# Patient Record
Sex: MALE | Race: WHITE | Employment: OTHER | URBAN - METROPOLITAN AREA
[De-identification: names, ages, dates, MRNs, and addresses within clinical notes are randomized per-mention and may not be internally consistent; named-entity substitution may affect disease eponyms.]

---

## 2024-07-12 ENCOUNTER — HOSPITAL ENCOUNTER (OUTPATIENT)
Dept: RADIOLOGY | Facility: HOSPITAL | Age: 64
Discharge: HOME | End: 2024-07-12
Payer: MEDICARE

## 2024-07-12 DIAGNOSIS — D48.7 NEOPLASM OF UNCERTAIN BEHAVIOR OF OTHER SPECIFIED SITES: ICD-10-CM

## 2024-07-12 LAB
CREAT SERPL-MCNC: 1.15 MG/DL (ref 0.6–1.3)
GFR SERPL CREATININE-BSD FRML MDRD: 70 ML/MIN/1.73M*2

## 2024-07-12 PROCEDURE — 82565 ASSAY OF CREATININE: CPT

## 2024-07-12 PROCEDURE — 70487 CT MAXILLOFACIAL W/DYE: CPT

## 2024-07-12 PROCEDURE — 2550000001 HC RX 255 CONTRASTS: Performed by: OTOLARYNGOLOGY

## 2024-07-18 ENCOUNTER — HOSPITAL ENCOUNTER (OUTPATIENT)
Dept: RADIOLOGY | Facility: CLINIC | Age: 64
Discharge: HOME | End: 2024-07-18
Payer: MEDICARE

## 2024-07-18 DIAGNOSIS — D48.7 NEOPLASM OF UNCERTAIN BEHAVIOR OF OTHER SPECIFIED SITES: ICD-10-CM

## 2024-07-18 PROCEDURE — 70543 MRI ORBT/FAC/NCK W/O &W/DYE: CPT

## 2024-07-18 PROCEDURE — A9575 INJ GADOTERATE MEGLUMI 0.1ML: HCPCS | Performed by: OTOLARYNGOLOGY

## 2024-07-18 PROCEDURE — 2550000001 HC RX 255 CONTRASTS: Performed by: OTOLARYNGOLOGY

## 2024-07-18 RX ORDER — GADOTERATE MEGLUMINE 376.9 MG/ML
0.2 INJECTION INTRAVENOUS
Status: COMPLETED | OUTPATIENT
Start: 2024-07-18 | End: 2024-07-18

## 2024-07-22 ENCOUNTER — OFFICE VISIT (OUTPATIENT)
Dept: OTOLARYNGOLOGY | Facility: HOSPITAL | Age: 64
End: 2024-07-22
Payer: MEDICARE

## 2024-07-22 VITALS — HEIGHT: 73 IN | BODY MASS INDEX: 22.53 KG/M2 | TEMPERATURE: 98.2 F | WEIGHT: 170 LBS

## 2024-07-22 DIAGNOSIS — R22.0 FACIAL MASS: ICD-10-CM

## 2024-07-22 PROBLEM — F02.A0: Status: ACTIVE | Noted: 2024-07-22

## 2024-07-22 PROBLEM — G30.9: Status: ACTIVE | Noted: 2024-07-22

## 2024-07-22 PROCEDURE — 31575 DIAGNOSTIC LARYNGOSCOPY: CPT | Performed by: OTOLARYNGOLOGY

## 2024-07-22 PROCEDURE — 1159F MED LIST DOCD IN RCRD: CPT | Performed by: OTOLARYNGOLOGY

## 2024-07-22 PROCEDURE — 1157F ADVNC CARE PLAN IN RCRD: CPT | Performed by: OTOLARYNGOLOGY

## 2024-07-22 PROCEDURE — 1160F RVW MEDS BY RX/DR IN RCRD: CPT | Performed by: OTOLARYNGOLOGY

## 2024-07-22 PROCEDURE — 3008F BODY MASS INDEX DOCD: CPT | Performed by: OTOLARYNGOLOGY

## 2024-07-22 PROCEDURE — 99215 OFFICE O/P EST HI 40 MIN: CPT | Mod: 25 | Performed by: OTOLARYNGOLOGY

## 2024-07-22 PROCEDURE — 10021 FNA BX W/O IMG GDN 1ST LES: CPT | Performed by: OTOLARYNGOLOGY

## 2024-07-22 PROCEDURE — 1036F TOBACCO NON-USER: CPT | Performed by: OTOLARYNGOLOGY

## 2024-07-22 PROCEDURE — 99215 OFFICE O/P EST HI 40 MIN: CPT | Performed by: OTOLARYNGOLOGY

## 2024-07-22 PROCEDURE — 88112 CYTOPATH CELL ENHANCE TECH: CPT | Mod: TC,MCY | Performed by: OTOLARYNGOLOGY

## 2024-07-22 ASSESSMENT — PATIENT HEALTH QUESTIONNAIRE - PHQ9
SUM OF ALL RESPONSES TO PHQ9 QUESTIONS 1 & 2: 0
1. LITTLE INTEREST OR PLEASURE IN DOING THINGS: NOT AT ALL
2. FEELING DOWN, DEPRESSED OR HOPELESS: NOT AT ALL

## 2024-07-22 NOTE — LETTER
July 22, 2024     Mp Bee MD  [address]  [facility], [facility] [address]  [facility] [address]    Patient: Edmund Matthews   YOB: 1960   Date of Visit: 7/22/2024       Dear Dr. Mp Bee MD:    Thank you for referring Edmund Matthews to me for evaluation. Below are my notes for this consultation.  If you have questions, please do not hesitate to call me. I look forward to following your patient along with you.       Sincerely,     Misael Mitchell MD      CC: Sylvester Walker MD  ______________________________________________________________________________________    MrThaddeus Matthews is a very pleasant 64-year-old gentleman with a sudden onset of a new facial mass appearing approximately 2 weeks ago.  The patient was in his usual state of good health when he noted a swelling just lateral to his left nasal vault.  This has not changed significantly in size.  The mass is not painful and was not preceded by trauma or other inciting events.  The patient is a non-smoker and nondrinker.  He was recently diagnosed with early onset Alzheimer's.    On physical examination he is a healthy-appearing gentleman in no distress.  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  There is a fullness just superior and lateral to the nasal ala which is ballotable.  Inspection of his oral cavity reveals an expansion of the alveolus and a significant fullness of the hard palate.  The patient states that his hard palate has not changed over time and his wife is unaware of whether or not these changes are new or pre-existing.  With the patient's history of Alzheimer's is difficult to determine if this was a longstanding change due to his cleft lip and palate or new due to this mass.  He does not have any loose teeth in his left maxillary region.  His neck is supple without adenopathy or masses.    Flexible fiberoptic examination was performed through  the left nares.  Inspection of his nasal cavity reveals a narrowed vault however no evidence of an intranasal mass.  There does appear to be inward bulging from the left lateral nasal wall.  His nasopharynx oropharynx and larynx are all within normal limits.    Procedure: Fine-needle aspiration  After injection of 1% lidocaine with 1 100,000 epinephrine, a 25-gauge needle was passed in to the facial mass transfacial he.  2 separate passes were performed and placed in CytoLyt.  These were sent to cytology for pathologic examination.    Overall, it appears that Mr. Edmund Matthews has a neoplastic mass of his left maxilla and anterior facial structures.  I instructed the wife and the patient that this process is most likely neoplastic and may represent a facial lymphoma versus a malignancy.  I performed a fine-needle aspiration today to rule out a malignancy but informed the family that if this returns consistent with lymphocytes, or is nondiagnostic, we will need to do an open biopsy procedure for definitive diagnosis.  Patient and the family understand that this is an outpatient brief procedure and would be arranged and lieu of his next appointment.  Another possibility would be a Nba-Cut needle biopsy at the hands of our interventional radiologist.  We will await the results of his fine-needle aspiration and proceed with the appropriate next steps.

## 2024-07-22 NOTE — PROGRESS NOTES
Mr. Edmund Matthews is a very pleasant 64-year-old gentleman with a sudden onset of a new facial mass appearing approximately 2 weeks ago.  The patient was in his usual state of good health when he noted a swelling just lateral to his left nasal vault.  This has not changed significantly in size.  The mass is not painful and was not preceded by trauma or other inciting events.  The patient is a non-smoker and nondrinker.  He was recently diagnosed with early onset Alzheimer's.    On physical examination he is a healthy-appearing gentleman in no distress.  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  There is a fullness just superior and lateral to the nasal ala which is ballotable.  Inspection of his oral cavity reveals an expansion of the alveolus and a significant fullness of the hard palate.  The patient states that his hard palate has not changed over time and his wife is unaware of whether or not these changes are new or pre-existing.  With the patient's history of Alzheimer's is difficult to determine if this was a longstanding change due to his cleft lip and palate or new due to this mass.  He does not have any loose teeth in his left maxillary region.  His neck is supple without adenopathy or masses.    Flexible fiberoptic examination was performed through the left nares.  Inspection of his nasal cavity reveals a narrowed vault however no evidence of an intranasal mass.  There does appear to be inward bulging from the left lateral nasal wall.  His nasopharynx oropharynx and larynx are all within normal limits.    Procedure: Fine-needle aspiration  After injection of 1% lidocaine with 1 100,000 epinephrine, a 25-gauge needle was passed in to the facial mass transfacial he.  2 separate passes were performed and placed in CytoLyt.  These were sent to cytology for pathologic examination.    Overall, it appears that Mr. Edmund Matthews has a neoplastic mass of his  left maxilla and anterior facial structures.  I instructed the wife and the patient that this process is most likely neoplastic and may represent a facial lymphoma versus a malignancy.  I performed a fine-needle aspiration today to rule out a malignancy but informed the family that if this returns consistent with lymphocytes, or is nondiagnostic, we will need to do an open biopsy procedure for definitive diagnosis.  Patient and the family understand that this is an outpatient brief procedure and would be arranged and lieu of his next appointment.  Another possibility would be a Nba-Cut needle biopsy at the hands of our interventional radiologist.  We will await the results of his fine-needle aspiration and proceed with the appropriate next steps.

## 2024-07-23 LAB
LABORATORY COMMENT REPORT: NORMAL
LABORATORY COMMENT REPORT: NORMAL
PATH REPORT.COMMENTS IMP SPEC: NORMAL
PATH REPORT.FINAL DX SPEC: NORMAL
PATH REPORT.GROSS SPEC: NORMAL
PATH REPORT.RELEVANT HX SPEC: NORMAL
PATH REPORT.TOTAL CANCER: NORMAL

## 2024-07-25 ENCOUNTER — TELEPHONE (OUTPATIENT)
Dept: OTOLARYNGOLOGY | Facility: HOSPITAL | Age: 64
End: 2024-07-25
Payer: MEDICARE

## 2024-07-25 DIAGNOSIS — R22.0 FACIAL MASS: ICD-10-CM

## 2024-07-25 NOTE — TELEPHONE ENCOUNTER
"I called and left a message yesterday at 5:30 p.m. asking Gab to give me a call.  We have his biopsy results back.    Lena, his wife left me a message last night at 8:27 p.m. stating she will have Edmund call me today, however, she's going out of town today (7/25/24).  She added that Edmund \"has early Alzehiemer's\" so if I have issues connecting with him to call her cell ([phone]).    Edmund did call me and left messages at 9:48 a.m., 9:56 a.m., 10:09 a.m. and 10:22 a.m.  I called him back and reviewed the biopsy results with him.    I stated as Dr. Mitchell told him in the office if the biopsy came back with lymphocytes, he would either need an open biopsy or an IR biopsy.  I explained the difference to him and told him that he would need a  for which ever one he chose.  I added that I was aware Lena was out of town.  He stated she was coming back on 7/31/24.    He opted for the IR biopsy.  I gave him the phone number to call to schedule the test ([phone]).  I asked that he speak with Lena first so that he would have some dates that she would be available to drive him.  He stated he will call to arrange the procedure.    Just as a backup, I did call Lena to make her aware.  She was appreciative and stated she's will talk with Edmund.  "

## 2024-08-09 ENCOUNTER — HOSPITAL ENCOUNTER (OUTPATIENT)
Dept: RADIOLOGY | Facility: HOSPITAL | Age: 64
Discharge: HOME | End: 2024-08-09
Payer: MEDICARE

## 2024-08-09 VITALS
HEIGHT: 73 IN | SYSTOLIC BLOOD PRESSURE: 110 MMHG | DIASTOLIC BLOOD PRESSURE: 71 MMHG | RESPIRATION RATE: 14 BRPM | OXYGEN SATURATION: 96 % | TEMPERATURE: 97.5 F | HEART RATE: 51 BPM | BODY MASS INDEX: 23.86 KG/M2 | WEIGHT: 180 LBS

## 2024-08-09 DIAGNOSIS — R22.0 FACIAL MASS: ICD-10-CM

## 2024-08-09 PROCEDURE — 7100000010 HC PHASE TWO TIME - EACH INCREMENTAL 1 MINUTE

## 2024-08-09 PROCEDURE — 88307 TISSUE EXAM BY PATHOLOGIST: CPT | Mod: TC,SUR | Performed by: STUDENT IN AN ORGANIZED HEALTH CARE EDUCATION/TRAINING PROGRAM

## 2024-08-09 PROCEDURE — 88341 IMHCHEM/IMCYTCHM EA ADD ANTB: CPT | Performed by: PATHOLOGY

## 2024-08-09 PROCEDURE — 88184 FLOWCYTOMETRY/ TC 1 MARKER: CPT | Mod: TC | Performed by: STUDENT IN AN ORGANIZED HEALTH CARE EDUCATION/TRAINING PROGRAM

## 2024-08-09 PROCEDURE — 88307 TISSUE EXAM BY PATHOLOGIST: CPT | Performed by: PATHOLOGY

## 2024-08-09 PROCEDURE — 7100000009 HC PHASE TWO TIME - INITIAL BASE CHARGE

## 2024-08-09 PROCEDURE — 88342 IMHCHEM/IMCYTCHM 1ST ANTB: CPT | Performed by: PATHOLOGY

## 2024-08-09 PROCEDURE — 88271 CYTOGENETICS DNA PROBE: CPT | Performed by: STUDENT IN AN ORGANIZED HEALTH CARE EDUCATION/TRAINING PROGRAM

## 2024-08-09 PROCEDURE — 88275 CYTOGENETICS 100-300: CPT | Performed by: STUDENT IN AN ORGANIZED HEALTH CARE EDUCATION/TRAINING PROGRAM

## 2024-08-09 PROCEDURE — 88189 FLOWCYTOMETRY/READ 16 & >: CPT | Performed by: PATHOLOGY

## 2024-08-09 PROCEDURE — 2500000004 HC RX 250 GENERAL PHARMACY W/ HCPCS (ALT 636 FOR OP/ED): Performed by: RADIOLOGY

## 2024-08-09 PROCEDURE — 2720000007 HC OR 272 NO HCPCS

## 2024-08-09 PROCEDURE — 88341 IMHCHEM/IMCYTCHM EA ADD ANTB: CPT | Mod: TC,SUR,MUE | Performed by: STUDENT IN AN ORGANIZED HEALTH CARE EDUCATION/TRAINING PROGRAM

## 2024-08-09 PROCEDURE — 20206 BIOPSY MUSCLE PERQ NEEDLE: CPT

## 2024-08-09 PROCEDURE — 88365 INSITU HYBRIDIZATION (FISH): CPT | Performed by: PATHOLOGY

## 2024-08-09 RX ORDER — MIDAZOLAM HYDROCHLORIDE 1 MG/ML
INJECTION INTRAMUSCULAR; INTRAVENOUS
Status: COMPLETED | OUTPATIENT
Start: 2024-08-09 | End: 2024-08-09

## 2024-08-09 RX ORDER — FENTANYL CITRATE 50 UG/ML
INJECTION, SOLUTION INTRAMUSCULAR; INTRAVENOUS
Status: COMPLETED | OUTPATIENT
Start: 2024-08-09 | End: 2024-08-09

## 2024-08-09 ASSESSMENT — PAIN SCALES - GENERAL
PAINLEVEL_OUTOF10: 0 - NO PAIN

## 2024-08-09 ASSESSMENT — PAIN - FUNCTIONAL ASSESSMENT
PAIN_FUNCTIONAL_ASSESSMENT: 0-10

## 2024-08-09 NOTE — DISCHARGE INSTRUCTIONS
Discharge information    See Ultrasound guided biopsy    Ok to remove dressing on 8/10/24 at 2pm.  Ok to shower on 8/10/24, no baths, jacuzzis, or swimming. Do not get submerged in a body of water (leads to increased risk of infection.)  Ok to keep open until healed. Healing is when a scab is created and falls off, usually within 5-10 days.     Look for signs and symptoms of infection:  Including: redness, swelling, discharge such as pus, or odor from site.    Look for bleeding from site:  If bleeding occurs hold pressure for 5 minutes with paper towel, check site if still bleeding hold for 5 more minutes  If site continues to bleed after 10 minutes call 911.    You received moderate sedation:  - Do not drive a car, or operate any machinery or power tools of any kind.  - Do not drink any alcoholic drinks.  - Do not take any over the counter medications that may cause drowsiness.  - Do not make any important decisions or sign any legal documents.  - You need to have a responsible adult accompany you home.  - You may resume your normal diet.  - We strongly suggest that a responsible adult be with you for the rest of the day and also during the night. This is for your protection and safety.     For questions related to your procedure:  Please call [phone] between the hours of 7:00am-5:00pm Monday through Friday.  Please call [phone] for Resident on call weeknights after 5:00pm, on weekends, and holidays.     In the event of an emergency call 911 or go to your nearest emergency room.     Ok to use ice pack for 20 min to site as needed at a time.  Ok to take tylenol (500mg) 2 tablets (1000mg) by mouth every 6 hours as needed for pain. Max 4000mg/24 hours.

## 2024-08-09 NOTE — PRE-PROCEDURE NOTE
INTERVENTIONAL RADIOLOGY PRE-PROCEDURE NOTE    Edmund Matthews is a 64 y.o. male with no significant PMHx who presents to the interventional radiology department for left facial lesion biopsy.    Procedure: left facial lesion biopsy    Indication for procedure: The encounter diagnosis was Facial mass.    No past medical history on file.   Past Surgical History:   Procedure Laterality Date    OTHER SURGICAL HISTORY  01/03/2022    Cleft palate repair       Relevant Labs:   Lab Results   Component Value Date    CREATININE 1.13 05/27/2022    EGFR 70 07/12/2024       Planned Sedation/Anesthesia: Moderate    Directed physical examination:    General: Normal appearance, behavior, cognition and NAD  Lungs: No increased work of breathing    No current outpatient medications on file.     Mallampati: II (hard and soft palate, upper portion of tonsils anduvula visible)    ASA Score: ASA 2 - Patient with mild systemic disease with no functional limitations    Benefits, risks and alternatives of procedure and planned sedation have been discussed with the patient and/or their representative. All questions answered and they agree to proceed.     Pablo Riley MD  Diagnostic Radiology, PGY-5, R4      NON-Urgent on call weekends and after hours weekdays (5pm - 5am) IR pager: [phone]  Urgent & emergent on call weekends and after hours weekdays (5pm-7am) IR pager: [phone]

## 2024-08-09 NOTE — POST-PROCEDURE NOTE
Interventional Radiology Brief Postprocedure Note    Attending: Esther Castellano MD    Assistant: Pablo Riley MD    Diagnosis: left facial mass    Description of procedure:    A total of 2 passes were made into the left facial mass under ultrasound guidance using a 18 Gauge needle passed through a 17 gauge coaxial system. Scanning after each pass demonstrated no bleeding.  One core sample was placed in formalin and the other core sample was placed in RPMI. Specimens were sent to pathology for further analysis. See PACS for full procedural report.      Anesthesia:  Local, moderate sedation    Complications: None    Estimated Blood Loss: none    Medications (Filter: Administrations occurring from 1424 to 1500 on 08/09/24) As of 08/09/24 1500      fentaNYL PF (Sublimaze) injection (mcg) Total dose:  50 mcg      Date/Time Rate/Dose/Volume Action       08/09/24  1430 50 mcg Given               midazolam (Versed) injection (mg) Total dose:  1 mg      Date/Time Rate/Dose/Volume Action       08/09/24  1430 1 mg Given                     See detailed result report with images in PACS.    The patient tolerated the procedure well without incident or complication and is in stable condition.     Pablo Riley MD  Diagnostic Radiology, PGY-5, R4    NON-Urgent on call weekends and after hours weekdays (5pm - 5am) IR pager: [phone]  Urgent & emergent on call weekends and after hours weekdays (5pm-7am) IR pager: [phone]

## 2024-08-12 ENCOUNTER — APPOINTMENT (OUTPATIENT)
Dept: OTOLARYNGOLOGY | Facility: HOSPITAL | Age: 64
End: 2024-08-12
Payer: MEDICARE

## 2024-08-14 LAB
CELL COUNT (BLOOD): 0.01 X10*3/UL
CELL POPULATIONS: NORMAL
DIAGNOSIS: NORMAL
FLOW DIFFERENTIAL: NORMAL
FLOW TEST ORDERED: NORMAL
LAB TEST METHOD: NORMAL
NUMBER OF CELLS COLLECTED: NORMAL PER TUBE
PATH REPORT.TOTAL CANCER: NORMAL
SIGNATURE COMMENT: NORMAL
SPECIMEN VIABILITY: NORMAL

## 2024-08-22 LAB
LAB AP ASR DISCLAIMER: NORMAL
LABORATORY COMMENT REPORT: NORMAL
PATH REPORT.FINAL DX SPEC: NORMAL
PATH REPORT.GROSS SPEC: NORMAL
PATH REPORT.RELEVANT HX SPEC: NORMAL
PATH REPORT.TOTAL CANCER: NORMAL

## 2024-08-23 ENCOUNTER — OFFICE VISIT (OUTPATIENT)
Dept: OTOLARYNGOLOGY | Facility: HOSPITAL | Age: 64
End: 2024-08-23
Payer: MEDICARE

## 2024-08-23 VITALS — HEIGHT: 73 IN | TEMPERATURE: 97.9 F | WEIGHT: 170 LBS | BODY MASS INDEX: 22.53 KG/M2

## 2024-08-23 DIAGNOSIS — C83.31 DIFFUSE LARGE B-CELL LYMPHOMA OF LYMPH NODES OF HEAD (MULTI): ICD-10-CM

## 2024-08-23 PROCEDURE — 1157F ADVNC CARE PLAN IN RCRD: CPT | Performed by: OTOLARYNGOLOGY

## 2024-08-23 PROCEDURE — 1160F RVW MEDS BY RX/DR IN RCRD: CPT | Performed by: OTOLARYNGOLOGY

## 2024-08-23 PROCEDURE — 3008F BODY MASS INDEX DOCD: CPT | Performed by: OTOLARYNGOLOGY

## 2024-08-23 PROCEDURE — 1036F TOBACCO NON-USER: CPT | Performed by: OTOLARYNGOLOGY

## 2024-08-23 PROCEDURE — 1159F MED LIST DOCD IN RCRD: CPT | Performed by: OTOLARYNGOLOGY

## 2024-08-23 PROCEDURE — 99213 OFFICE O/P EST LOW 20 MIN: CPT | Performed by: OTOLARYNGOLOGY

## 2024-08-23 ASSESSMENT — PATIENT HEALTH QUESTIONNAIRE - PHQ9
2. FEELING DOWN, DEPRESSED OR HOPELESS: NOT AT ALL
SUM OF ALL RESPONSES TO PHQ9 QUESTIONS 1 & 2: 0
1. LITTLE INTEREST OR PLEASURE IN DOING THINGS: NOT AT ALL

## 2024-08-23 NOTE — LETTER
August 23, 2024     Mp Bee MD  [address]  [facility], [facility] [address]  [facility] [address]    Patient: Edmund Matthews   YOB: 1960   Date of Visit: 8/23/2024       Dear Dr. Mp Bee MD:    Thank you for referring Edmund Matthews to me for evaluation. Below are my notes for this consultation.  If you have questions, please do not hesitate to call me. I look forward to following your patient along with you.       Sincerely,     Misael Mitchell MD      CC: Sylvester Walker MD  ______________________________________________________________________________________    Mr. Matthews is a very pleasant 64-year-old gentleman with a known history of early onset Alzheimer's dementia which is mild at present, who was recently evaluated by myself for a left-sided facial mass in the nasolabial groove region.  An initial fine-needle aspiration was nondiagnostic.  A subsequent Nba-Cut needle biopsy has returned consistent with diffuse large B-cell lymphoma.  The patient states there is no pain or other symptoms associated with this lesion.  He denies any night sweats weight loss or other constitutional symptoms.    On physical examination he is a healthy-appearing gentleman in no distress.  He has a subcutaneous fullness in his nasolabial groove extending up to near his medial canthus region.  Intranasally there is no evidence of a mass affect.    Overall it appears that Mr. Matthews has a newly diagnosed diffuse large B cell lymphoma.  We will order a staging PET scan to evaluate the extent of his lymphoma.  Will also send him to see Dr. Griffin Duckworth for definitive treatment.  He will return to see me on an as necessary basis.     Impression: Type 2 diabetes mellitus without complications: D10.1. Plan: No diabetic retinopathy. Recommend yearly diabetic eye exam. Discussed with patient importance of good blood sugar control with regular visits with PCP, compliance with medications, healthy diet and daily exercise.

## 2024-08-23 NOTE — PROGRESS NOTES
Mr. Matthews is a very pleasant 64-year-old gentleman with a known history of early onset Alzheimer's dementia which is mild at present, who was recently evaluated by myself for a left-sided facial mass in the nasolabial groove region.  An initial fine-needle aspiration was nondiagnostic.  A subsequent Nba-Cut needle biopsy has returned consistent with diffuse large B-cell lymphoma.  The patient states there is no pain or other symptoms associated with this lesion.  He denies any night sweats weight loss or other constitutional symptoms.    On physical examination he is a healthy-appearing gentleman in no distress.  He has a subcutaneous fullness in his nasolabial groove extending up to near his medial canthus region.  Intranasally there is no evidence of a mass affect.    Overall it appears that Mr. Matthews has a newly diagnosed diffuse large B cell lymphoma.  We will order a staging PET scan to evaluate the extent of his lymphoma.  Will also send him to see Dr. Griffin Duckworth for definitive treatment.  He will return to see me on an as necessary basis.

## 2024-08-26 ENCOUNTER — APPOINTMENT (OUTPATIENT)
Dept: OTOLARYNGOLOGY | Facility: HOSPITAL | Age: 64
End: 2024-08-26
Payer: MEDICARE

## 2024-08-29 ENCOUNTER — HOSPITAL ENCOUNTER (OUTPATIENT)
Dept: RADIOLOGY | Facility: HOSPITAL | Age: 64
Discharge: HOME | End: 2024-08-29
Payer: MEDICARE

## 2024-08-29 DIAGNOSIS — C83.31 DIFFUSE LARGE B-CELL LYMPHOMA OF LYMPH NODES OF HEAD (MULTI): ICD-10-CM

## 2024-08-29 PROCEDURE — 78815 PET IMAGE W/CT SKULL-THIGH: CPT | Mod: PS

## 2024-08-29 PROCEDURE — 78815 PET IMAGE W/CT SKULL-THIGH: CPT | Mod: PET TUMOR SUBSQ TX STRATEGY | Performed by: RADIOLOGY

## 2024-08-29 PROCEDURE — 3430000001 HC RX 343 DIAGNOSTIC RADIOPHARMACEUTICALS: Performed by: OTOLARYNGOLOGY

## 2024-08-29 PROCEDURE — A9552 F18 FDG: HCPCS | Performed by: OTOLARYNGOLOGY

## 2024-08-29 RX ORDER — FLUDEOXYGLUCOSE F 18 200 MCI/ML
11.9 INJECTION, SOLUTION INTRAVENOUS
Status: COMPLETED | OUTPATIENT
Start: 2024-08-29 | End: 2024-08-29

## 2024-08-30 ENCOUNTER — OFFICE VISIT (OUTPATIENT)
Dept: HEMATOLOGY/ONCOLOGY | Facility: HOSPITAL | Age: 64
End: 2024-08-30
Payer: MEDICARE

## 2024-08-30 ENCOUNTER — LAB (OUTPATIENT)
Dept: LAB | Facility: HOSPITAL | Age: 64
End: 2024-08-30
Payer: MEDICARE

## 2024-08-30 VITALS
WEIGHT: 168.6 LBS | TEMPERATURE: 97.7 F | SYSTOLIC BLOOD PRESSURE: 130 MMHG | OXYGEN SATURATION: 99 % | RESPIRATION RATE: 16 BRPM | HEIGHT: 72 IN | DIASTOLIC BLOOD PRESSURE: 67 MMHG | BODY MASS INDEX: 22.84 KG/M2 | HEART RATE: 64 BPM

## 2024-08-30 DIAGNOSIS — C83.38 DIFFUSE LARGE B-CELL LYMPHOMA OF LYMPH NODES OF MULTIPLE REGIONS (MULTI): Primary | ICD-10-CM

## 2024-08-30 DIAGNOSIS — T45.1X5A CHEMOTHERAPY ADVERSE REACTION, INITIAL ENCOUNTER: ICD-10-CM

## 2024-08-30 DIAGNOSIS — C83.31 DIFFUSE LARGE B-CELL LYMPHOMA OF LYMPH NODES OF HEAD (MULTI): ICD-10-CM

## 2024-08-30 DIAGNOSIS — C83.38 DIFFUSE LARGE B-CELL LYMPHOMA OF LYMPH NODES OF MULTIPLE REGIONS (MULTI): ICD-10-CM

## 2024-08-30 LAB
ALBUMIN SERPL BCP-MCNC: 4.7 G/DL (ref 3.4–5)
ALP SERPL-CCNC: 70 U/L (ref 33–136)
ALT SERPL W P-5'-P-CCNC: 13 U/L (ref 10–52)
ANION GAP SERPL CALC-SCNC: 17 MMOL/L (ref 10–20)
AST SERPL W P-5'-P-CCNC: 17 U/L (ref 9–39)
BASOPHILS # BLD AUTO: 0.04 X10*3/UL (ref 0–0.1)
BASOPHILS NFR BLD AUTO: 0.9 %
BILIRUB SERPL-MCNC: 0.8 MG/DL (ref 0–1.2)
BUN SERPL-MCNC: 21 MG/DL (ref 6–23)
CALCIUM SERPL-MCNC: 10.1 MG/DL (ref 8.6–10.6)
CHLORIDE SERPL-SCNC: 104 MMOL/L (ref 98–107)
CHROM ANALY OVERALL INTERP-IMP: NORMAL
CHROM ANALY OVERALL INTERP-IMP: NORMAL
CO2 SERPL-SCNC: 25 MMOL/L (ref 21–32)
CREAT SERPL-MCNC: 1.25 MG/DL (ref 0.5–1.3)
EGFRCR SERPLBLD CKD-EPI 2021: 64 ML/MIN/1.73M*2
ELECTRONICALLY COSIGNED BY CYTOGENETICS: NORMAL
ELECTRONICALLY COSIGNED BY CYTOGENETICS: NORMAL
ELECTRONICALLY SIGNED BY CYTOGENETICS: NORMAL
ELECTRONICALLY SIGNED BY CYTOGENETICS: NORMAL
EOSINOPHIL # BLD AUTO: 0.04 X10*3/UL (ref 0–0.7)
EOSINOPHIL NFR BLD AUTO: 0.9 %
ERYTHROCYTE [DISTWIDTH] IN BLOOD BY AUTOMATED COUNT: 13.6 % (ref 11.5–14.5)
FISH ISCN RESULTS: NORMAL
FISH ISCN RESULTS: NORMAL
GLUCOSE SERPL-MCNC: 88 MG/DL (ref 74–99)
HBV CORE AB SER QL: NONREACTIVE
HBV SURFACE AG SERPL QL IA: NONREACTIVE
HCT VFR BLD AUTO: 42.4 % (ref 41–52)
HCV AB SER QL: NONREACTIVE
HGB BLD-MCNC: 13.5 G/DL (ref 13.5–17.5)
IMM GRANULOCYTES # BLD AUTO: 0.01 X10*3/UL (ref 0–0.7)
IMM GRANULOCYTES NFR BLD AUTO: 0.2 % (ref 0–0.9)
LDH SERPL L TO P-CCNC: 186 U/L (ref 84–246)
LYMPHOCYTES # BLD AUTO: 1.41 X10*3/UL (ref 1.2–4.8)
LYMPHOCYTES NFR BLD AUTO: 32.9 %
MCH RBC QN AUTO: 29.7 PG (ref 26–34)
MCHC RBC AUTO-ENTMCNC: 31.8 G/DL (ref 32–36)
MCV RBC AUTO: 93 FL (ref 80–100)
MONOCYTES # BLD AUTO: 0.33 X10*3/UL (ref 0.1–1)
MONOCYTES NFR BLD AUTO: 7.7 %
NEUTROPHILS # BLD AUTO: 2.46 X10*3/UL (ref 1.2–7.7)
NEUTROPHILS NFR BLD AUTO: 57.4 %
NRBC BLD-RTO: 0 /100 WBCS (ref 0–0)
PLATELET # BLD AUTO: 179 X10*3/UL (ref 150–450)
POTASSIUM SERPL-SCNC: 4.5 MMOL/L (ref 3.5–5.3)
PROT SERPL-MCNC: 7.8 G/DL (ref 6.4–8.2)
RBC # BLD AUTO: 4.54 X10*6/UL (ref 4.5–5.9)
SODIUM SERPL-SCNC: 141 MMOL/L (ref 136–145)
WBC # BLD AUTO: 4.3 X10*3/UL (ref 4.4–11.3)

## 2024-08-30 PROCEDURE — 87799 DETECT AGENT NOS DNA QUANT: CPT | Performed by: INTERNAL MEDICINE

## 2024-08-30 PROCEDURE — 1157F ADVNC CARE PLAN IN RCRD: CPT | Performed by: INTERNAL MEDICINE

## 2024-08-30 PROCEDURE — 3008F BODY MASS INDEX DOCD: CPT | Performed by: INTERNAL MEDICINE

## 2024-08-30 PROCEDURE — 87340 HEPATITIS B SURFACE AG IA: CPT | Performed by: INTERNAL MEDICINE

## 2024-08-30 PROCEDURE — 86803 HEPATITIS C AB TEST: CPT | Performed by: INTERNAL MEDICINE

## 2024-08-30 PROCEDURE — 1159F MED LIST DOCD IN RCRD: CPT | Performed by: INTERNAL MEDICINE

## 2024-08-30 PROCEDURE — 1126F AMNT PAIN NOTED NONE PRSNT: CPT | Performed by: INTERNAL MEDICINE

## 2024-08-30 PROCEDURE — 99215 OFFICE O/P EST HI 40 MIN: CPT | Performed by: INTERNAL MEDICINE

## 2024-08-30 PROCEDURE — 99205 OFFICE O/P NEW HI 60 MIN: CPT | Performed by: INTERNAL MEDICINE

## 2024-08-30 PROCEDURE — 86704 HEP B CORE ANTIBODY TOTAL: CPT | Performed by: INTERNAL MEDICINE

## 2024-08-30 PROCEDURE — 85025 COMPLETE CBC W/AUTO DIFF WBC: CPT

## 2024-08-30 PROCEDURE — 80053 COMPREHEN METABOLIC PANEL: CPT

## 2024-08-30 PROCEDURE — 83615 LACTATE (LD) (LDH) ENZYME: CPT

## 2024-08-30 PROCEDURE — 36415 COLL VENOUS BLD VENIPUNCTURE: CPT

## 2024-08-30 ASSESSMENT — PAIN SCALES - GENERAL: PAINLEVEL_OUTOF10: 0-NO PAIN

## 2024-08-30 NOTE — PROGRESS NOTES
"Patient ID: Edmund Matthews is a 64 y.o. male.  Referring Physician: Misael Mitchell MD  [id number] [id number]  Primary Care Provider: Mp Bee MD      Subjective    The patient has been in excellent health, but was recently diagnosed with early onset Alzheimer's. He is not on treatments, but chose to retire early. On 7/3/2024 he noted swelling on the left side of the nose and cheek bone. No pain. This was quickly worked up, including a visit with Dr. Mitchell of [facility], and biopsy on 8/9/2024, showing   A: MAXILLA, BIOPSY:  --  LIMITED SAMPLE WITH EXTENSIVE CRUSH ARTIFACT; FINDINGS CONCERNING FOR LARGE B-CELL LYMPHOMA, FINAL CLASSIFICATION PENDING GENETIC STUDIES.  SUBTYPE BY BUD CRITERIA: Germinal center type (CD10-, BCL6+, MUM1-).   MYC/BCL2 EXPRESSOR PHENOTYPE: Not a double expressor (BCL2+, C-MYC-).    Today he feels well. Feels mild tingling sensation in the left face. No fever wt loss or night sweats. No pain or headache. No change in vision or swallowing. Memory deficit is mild and stable.          Review of Systems   Constitutional: Negative.    HENT:   Positive for lump/mass.    Eyes: Negative.    Respiratory: Negative.     Cardiovascular: Negative.    Gastrointestinal: Negative.    Endocrine: Negative.    Genitourinary: Negative.     Musculoskeletal: Negative.    Skin: Negative.    Neurological: Negative.    Hematological: Negative.    Psychiatric/Behavioral: Negative.          Objective   BSA: 1.97 meters squared  /67 (BP Location: Left arm, Patient Position: Sitting, BP Cuff Size: Adult)   Pulse 64   Temp 36.5 °C (97.7 °F) (Skin)   Resp 16   Ht (S) 1.834 m (6' 0.21\")   Wt 76.5 kg (168 lb 9.6 oz)   SpO2 99%   BMI 22.74 kg/m²     No family history on file.  Oncology History    No history exists.       Edmund Matthews  reports that he has never smoked. He has never used smokeless tobacco.  He  has no history on file for alcohol use.  He  has no history on file for drug " use.    Physical Exam  Constitutional:       General: He is not in acute distress.     Appearance: He is not toxic-appearing.   HENT:      Head: Normocephalic.      Nose: Nose normal.      Comments: A mass without clear border is appreciated between the left side of nose and the cheekbone. Non tender.      Mouth/Throat:      Mouth: Mucous membranes are moist.   Eyes:      Extraocular Movements: Extraocular movements intact.      Pupils: Pupils are equal, round, and reactive to light.   Cardiovascular:      Rate and Rhythm: Normal rate and regular rhythm.      Heart sounds: No murmur heard.  Pulmonary:      Effort: Pulmonary effort is normal.      Breath sounds: Normal breath sounds.   Abdominal:      General: Bowel sounds are normal.      Palpations: Abdomen is soft. There is no mass.      Tenderness: There is no abdominal tenderness. There is no rebound.   Musculoskeletal:         General: No swelling, tenderness, deformity or signs of injury.      Right lower leg: No edema.      Left lower leg: No edema.   Skin:     Coloration: Skin is not jaundiced.      Findings: No bruising, lesion or rash.   Neurological:      Mental Status: He is alert and oriented to person, place, and time.      Cranial Nerves: No cranial nerve deficit.      Motor: No weakness.      Gait: Gait normal.   Psychiatric:         Mood and Affect: Mood normal.         Performance Status:  Asymptomatic    Assessment/Plan        8/30 plan:  -labs including HBV.   -Tentatively IPI=3 (age, stage IV, extranodal sites 2). Interested in MARTINEZ 3 study comparing RCHOP with RCHOP + odronextamab ([id number] bispecific). Will screen. Question about whether he has mild dementia, and whether that is an exclusion criterion. Will discuss with sponsor.   -echo.   -MRI to evaluate orbits della left orbit.  -RTC 2 w.     A/P  ##DLBCL, newly dx, 8/9/2024:  - A: MAXILLA, BIOPSY:  --  LIMITED SAMPLE WITH EXTENSIVE CRUSH ARTIFACT; FINDINGS CONCERNING FOR LARGE B-CELL  LYMPHOMA, FINAL CLASSIFICATION PENDING GENETIC STUDIES.  SUBTYPE BY BUD CRITERIA: Germinal center type (CD10-, BCL6+, MUM1-).   MYC/BCL2 EXPRESSOR PHENOTYPE: Not a double expressor (BCL2+, C-MYC-).  -Stage IV due to extranodal involvement (sinus, bone, premasillary soft tissue, orbit, nose) as shown below in the PET of 8/29:  Intensely hypermetabolic mass centered at the left maxilla causing osseous erosion and extending  into the left pre maxillary soft tissues, with involvement of the inferior margin of the left orbit and left lateral nose, as well as the left maxillary sinus with SUV max of 8.4.  -Tumor burden is relatively low, but location is high risk.  -Will need to assess orbits.   -For stage IV DLBCL, will need systemic chemo. R-CHOP is considered a standard. Furthermore, the MARTINEZ 3 is also a reasonable or preferred option, as it randomize RCHOP vs RCHOP + Odronextamab. He is interested in the study. Research staff is on site to help and will follow up on eligibility.     #PLAN  -Pt was given MARTINEZ 3 study material.   -RTC 1w.   -echo, labs, MRI orbits.     Time spent: 65 min.                Heber Daley MD PhD

## 2024-09-01 LAB
EBV DNA SPEC NAA+PROBE-LOG#: NORMAL {LOG_COPIES}/ML
LABORATORY COMMENT REPORT: NOT DETECTED

## 2024-09-05 ENCOUNTER — OFFICE VISIT (OUTPATIENT)
Dept: HEMATOLOGY/ONCOLOGY | Facility: HOSPITAL | Age: 64
End: 2024-09-05
Payer: MEDICARE

## 2024-09-05 VITALS
WEIGHT: 171.2 LBS | DIASTOLIC BLOOD PRESSURE: 74 MMHG | RESPIRATION RATE: 16 BRPM | BODY MASS INDEX: 23.09 KG/M2 | SYSTOLIC BLOOD PRESSURE: 141 MMHG | TEMPERATURE: 97 F | HEART RATE: 57 BPM | OXYGEN SATURATION: 99 %

## 2024-09-05 DIAGNOSIS — C83.38 DIFFUSE LARGE B-CELL LYMPHOMA OF LYMPH NODES OF MULTIPLE REGIONS (MULTI): Primary | ICD-10-CM

## 2024-09-05 PROCEDURE — 1157F ADVNC CARE PLAN IN RCRD: CPT | Performed by: INTERNAL MEDICINE

## 2024-09-05 PROCEDURE — 1126F AMNT PAIN NOTED NONE PRSNT: CPT | Performed by: INTERNAL MEDICINE

## 2024-09-05 PROCEDURE — 99215 OFFICE O/P EST HI 40 MIN: CPT | Performed by: INTERNAL MEDICINE

## 2024-09-05 RX ORDER — DIPHENHYDRAMINE HCL 50 MG
50 CAPSULE ORAL ONCE
OUTPATIENT
Start: 2024-10-02

## 2024-09-05 RX ORDER — PROCHLORPERAZINE EDISYLATE 5 MG/ML
10 INJECTION INTRAMUSCULAR; INTRAVENOUS EVERY 6 HOURS PRN
Status: CANCELLED | OUTPATIENT
Start: 2024-10-05

## 2024-09-05 RX ORDER — DOXORUBICIN HYDROCHLORIDE 2 MG/ML
50 INJECTION, SOLUTION INTRAVENOUS ONCE
Status: CANCELLED | OUTPATIENT
Start: 2024-09-11

## 2024-09-05 RX ORDER — DIPHENHYDRAMINE HCL 50 MG
50 CAPSULE ORAL ONCE
Status: CANCELLED | OUTPATIENT
Start: 2024-09-11

## 2024-09-05 RX ORDER — DOXORUBICIN HYDROCHLORIDE 2 MG/ML
50 INJECTION, SOLUTION INTRAVENOUS ONCE
OUTPATIENT
Start: 2024-09-11

## 2024-09-05 RX ORDER — DIPHENHYDRAMINE HYDROCHLORIDE 50 MG/ML
50 INJECTION INTRAMUSCULAR; INTRAVENOUS AS NEEDED
Status: CANCELLED | OUTPATIENT
Start: 2024-10-02

## 2024-09-05 RX ORDER — PROCHLORPERAZINE EDISYLATE 5 MG/ML
10 INJECTION INTRAMUSCULAR; INTRAVENOUS EVERY 6 HOURS PRN
Status: CANCELLED | OUTPATIENT
Start: 2024-10-02

## 2024-09-05 RX ORDER — FAMOTIDINE 10 MG/ML
20 INJECTION INTRAVENOUS AS NEEDED
Status: CANCELLED | OUTPATIENT
Start: 2024-10-02

## 2024-09-05 RX ORDER — PROCHLORPERAZINE EDISYLATE 5 MG/ML
10 INJECTION INTRAMUSCULAR; INTRAVENOUS EVERY 6 HOURS PRN
OUTPATIENT
Start: 2024-09-11

## 2024-09-05 RX ORDER — DIPHENHYDRAMINE HYDROCHLORIDE 50 MG/ML
50 INJECTION INTRAMUSCULAR; INTRAVENOUS AS NEEDED
OUTPATIENT
Start: 2024-09-11

## 2024-09-05 RX ORDER — OLANZAPINE 5 MG/1
5 TABLET ORAL NIGHTLY
Status: CANCELLED | OUTPATIENT
Start: 2024-10-02

## 2024-09-05 RX ORDER — DIPHENHYDRAMINE HCL 50 MG
50 CAPSULE ORAL ONCE
Status: CANCELLED | OUTPATIENT
Start: 2024-10-05

## 2024-09-05 RX ORDER — PREDNISOLONE ACETATE 10 MG/ML
2 SUSPENSION/ DROPS OPHTHALMIC EVERY 6 HOURS
Status: CANCELLED | OUTPATIENT
Start: 2024-10-02

## 2024-09-05 RX ORDER — PALONOSETRON 0.05 MG/ML
0.25 INJECTION, SOLUTION INTRAVENOUS ONCE
Status: CANCELLED | OUTPATIENT
Start: 2024-09-11

## 2024-09-05 RX ORDER — FAMOTIDINE 10 MG/ML
20 INJECTION INTRAVENOUS ONCE AS NEEDED
Status: CANCELLED | OUTPATIENT
Start: 2024-10-05

## 2024-09-05 RX ORDER — EPINEPHRINE 0.3 MG/.3ML
0.3 INJECTION SUBCUTANEOUS EVERY 5 MIN PRN
Status: CANCELLED | OUTPATIENT
Start: 2024-09-11

## 2024-09-05 RX ORDER — DOXORUBICIN HYDROCHLORIDE 2 MG/ML
50 INJECTION, SOLUTION INTRAVENOUS ONCE
OUTPATIENT
Start: 2024-10-02

## 2024-09-05 RX ORDER — PROCHLORPERAZINE MALEATE 10 MG
10 TABLET ORAL EVERY 6 HOURS PRN
Status: CANCELLED | OUTPATIENT
Start: 2024-10-02

## 2024-09-05 RX ORDER — DIPHENHYDRAMINE HYDROCHLORIDE 50 MG/ML
50 INJECTION INTRAMUSCULAR; INTRAVENOUS AS NEEDED
Status: CANCELLED | OUTPATIENT
Start: 2024-09-11

## 2024-09-05 RX ORDER — PALONOSETRON 0.05 MG/ML
0.25 INJECTION, SOLUTION INTRAVENOUS ONCE
OUTPATIENT
Start: 2024-10-02

## 2024-09-05 RX ORDER — HEPARIN 100 UNIT/ML
500 SYRINGE INTRAVENOUS AS NEEDED
OUTPATIENT
Start: 2024-09-05

## 2024-09-05 RX ORDER — SODIUM CHLORIDE 9 MG/ML
150 INJECTION, SOLUTION INTRAVENOUS CONTINUOUS
Status: CANCELLED | OUTPATIENT
Start: 2024-10-02

## 2024-09-05 RX ORDER — ALBUTEROL SULFATE 0.83 MG/ML
3 SOLUTION RESPIRATORY (INHALATION) AS NEEDED
Status: CANCELLED | OUTPATIENT
Start: 2024-10-05

## 2024-09-05 RX ORDER — DIPHENHYDRAMINE HYDROCHLORIDE 50 MG/ML
50 INJECTION INTRAMUSCULAR; INTRAVENOUS AS NEEDED
Status: CANCELLED | OUTPATIENT
Start: 2024-10-05

## 2024-09-05 RX ORDER — ALBUTEROL SULFATE 0.83 MG/ML
3 SOLUTION RESPIRATORY (INHALATION) AS NEEDED
OUTPATIENT
Start: 2024-10-02

## 2024-09-05 RX ORDER — ALBUTEROL SULFATE 0.83 MG/ML
3 SOLUTION RESPIRATORY (INHALATION) AS NEEDED
Status: CANCELLED | OUTPATIENT
Start: 2024-10-02

## 2024-09-05 RX ORDER — ACETAMINOPHEN 325 MG/1
650 TABLET ORAL ONCE
Status: CANCELLED | OUTPATIENT
Start: 2024-09-11

## 2024-09-05 RX ORDER — DIPHENHYDRAMINE HYDROCHLORIDE 50 MG/ML
50 INJECTION INTRAMUSCULAR; INTRAVENOUS AS NEEDED
OUTPATIENT
Start: 2024-10-02

## 2024-09-05 RX ORDER — DIPHENHYDRAMINE HCL 50 MG
50 CAPSULE ORAL ONCE
OUTPATIENT
Start: 2024-09-11

## 2024-09-05 RX ORDER — PROCHLORPERAZINE MALEATE 10 MG
10 TABLET ORAL EVERY 6 HOURS PRN
Status: CANCELLED | OUTPATIENT
Start: 2024-10-05

## 2024-09-05 RX ORDER — PROCHLORPERAZINE MALEATE 10 MG
10 TABLET ORAL EVERY 6 HOURS PRN
Status: CANCELLED | OUTPATIENT
Start: 2024-09-11

## 2024-09-05 RX ORDER — FAMOTIDINE 10 MG/ML
20 INJECTION INTRAVENOUS ONCE AS NEEDED
Status: CANCELLED | OUTPATIENT
Start: 2024-09-11

## 2024-09-05 RX ORDER — HEPARIN SODIUM,PORCINE/PF 10 UNIT/ML
50 SYRINGE (ML) INTRAVENOUS AS NEEDED
OUTPATIENT
Start: 2024-09-05

## 2024-09-05 RX ORDER — PROCHLORPERAZINE EDISYLATE 5 MG/ML
10 INJECTION INTRAMUSCULAR; INTRAVENOUS EVERY 6 HOURS PRN
Status: CANCELLED | OUTPATIENT
Start: 2024-09-11

## 2024-09-05 RX ORDER — EPINEPHRINE 0.3 MG/.3ML
0.3 INJECTION SUBCUTANEOUS EVERY 5 MIN PRN
OUTPATIENT
Start: 2024-09-11

## 2024-09-05 RX ORDER — ACETAMINOPHEN 325 MG/1
650 TABLET ORAL ONCE
Status: CANCELLED | OUTPATIENT
Start: 2024-10-05

## 2024-09-05 RX ORDER — ALBUTEROL SULFATE 0.83 MG/ML
3 SOLUTION RESPIRATORY (INHALATION) AS NEEDED
Status: CANCELLED | OUTPATIENT
Start: 2024-09-11

## 2024-09-05 RX ORDER — ACETAMINOPHEN 325 MG/1
650 TABLET ORAL ONCE
OUTPATIENT
Start: 2024-10-02

## 2024-09-05 RX ORDER — EPINEPHRINE 1 MG/ML
0.3 INJECTION INTRAMUSCULAR; INTRAVENOUS; SUBCUTANEOUS EVERY 5 MIN PRN
Status: CANCELLED | OUTPATIENT
Start: 2024-10-02

## 2024-09-05 RX ORDER — PROCHLORPERAZINE MALEATE 10 MG
10 TABLET ORAL EVERY 6 HOURS PRN
OUTPATIENT
Start: 2024-09-11

## 2024-09-05 RX ORDER — PREDNISONE 20 MG/1
100 TABLET ORAL ONCE
Status: CANCELLED | OUTPATIENT
Start: 2024-09-11

## 2024-09-05 RX ORDER — PROCHLORPERAZINE EDISYLATE 5 MG/ML
10 INJECTION INTRAMUSCULAR; INTRAVENOUS EVERY 6 HOURS PRN
OUTPATIENT
Start: 2024-10-02

## 2024-09-05 RX ORDER — PROCHLORPERAZINE MALEATE 10 MG
10 TABLET ORAL EVERY 6 HOURS PRN
OUTPATIENT
Start: 2024-10-02

## 2024-09-05 RX ORDER — EPINEPHRINE 0.3 MG/.3ML
0.3 INJECTION SUBCUTANEOUS EVERY 5 MIN PRN
Status: CANCELLED | OUTPATIENT
Start: 2024-10-05

## 2024-09-05 RX ORDER — FAMOTIDINE 10 MG/ML
20 INJECTION INTRAVENOUS ONCE AS NEEDED
OUTPATIENT
Start: 2024-10-02

## 2024-09-05 RX ORDER — DEXAMETHASONE 4 MG/1
40 TABLET ORAL DAILY
Status: CANCELLED | OUTPATIENT
Start: 2024-10-02

## 2024-09-05 RX ORDER — ACETAMINOPHEN 325 MG/1
650 TABLET ORAL ONCE
OUTPATIENT
Start: 2024-09-11

## 2024-09-05 RX ORDER — FAMOTIDINE 10 MG/ML
20 INJECTION INTRAVENOUS ONCE AS NEEDED
OUTPATIENT
Start: 2024-09-11

## 2024-09-05 RX ORDER — PALONOSETRON 0.05 MG/ML
0.25 INJECTION, SOLUTION INTRAVENOUS ONCE
OUTPATIENT
Start: 2024-09-11

## 2024-09-05 RX ORDER — ALBUTEROL SULFATE 0.83 MG/ML
3 SOLUTION RESPIRATORY (INHALATION) AS NEEDED
OUTPATIENT
Start: 2024-09-11

## 2024-09-05 RX ORDER — EPINEPHRINE 0.3 MG/.3ML
0.3 INJECTION SUBCUTANEOUS EVERY 5 MIN PRN
OUTPATIENT
Start: 2024-10-02

## 2024-09-05 ASSESSMENT — PAIN SCALES - GENERAL: PAINLEVEL_OUTOF10: 0-NO PAIN

## 2024-09-06 ENCOUNTER — HOSPITAL ENCOUNTER (OUTPATIENT)
Dept: CARDIOLOGY | Facility: HOSPITAL | Age: 64
Discharge: HOME | End: 2024-09-06
Payer: MEDICARE

## 2024-09-06 DIAGNOSIS — C83.38 DIFFUSE LARGE B-CELL LYMPHOMA OF LYMPH NODES OF MULTIPLE REGIONS (MULTI): ICD-10-CM

## 2024-09-06 DIAGNOSIS — C83.31 DIFFUSE LARGE B-CELL LYMPHOMA OF LYMPH NODES OF HEAD (MULTI): ICD-10-CM

## 2024-09-06 DIAGNOSIS — T45.1X5A CHEMOTHERAPY ADVERSE REACTION, INITIAL ENCOUNTER: ICD-10-CM

## 2024-09-06 PROCEDURE — 93356 MYOCRD STRAIN IMG SPCKL TRCK: CPT | Performed by: STUDENT IN AN ORGANIZED HEALTH CARE EDUCATION/TRAINING PROGRAM

## 2024-09-06 PROCEDURE — 93356 MYOCRD STRAIN IMG SPCKL TRCK: CPT

## 2024-09-06 PROCEDURE — 93306 TTE W/DOPPLER COMPLETE: CPT | Performed by: STUDENT IN AN ORGANIZED HEALTH CARE EDUCATION/TRAINING PROGRAM

## 2024-09-06 ASSESSMENT — ENCOUNTER SYMPTOMS
MUSCULOSKELETAL NEGATIVE: 1
NEUROLOGICAL NEGATIVE: 1
HEMATOLOGIC/LYMPHATIC NEGATIVE: 1
GASTROINTESTINAL NEGATIVE: 1
EYES NEGATIVE: 1
CONSTITUTIONAL NEGATIVE: 1
EYES NEGATIVE: 1
HEMATOLOGIC/LYMPHATIC NEGATIVE: 1
NEUROLOGICAL NEGATIVE: 1
ENDOCRINE NEGATIVE: 1
PSYCHIATRIC NEGATIVE: 1
ENDOCRINE NEGATIVE: 1
RESPIRATORY NEGATIVE: 1
PSYCHIATRIC NEGATIVE: 1
CONSTITUTIONAL NEGATIVE: 1
GASTROINTESTINAL NEGATIVE: 1
CARDIOVASCULAR NEGATIVE: 1
CARDIOVASCULAR NEGATIVE: 1
MUSCULOSKELETAL NEGATIVE: 1
RESPIRATORY NEGATIVE: 1

## 2024-09-07 NOTE — PROGRESS NOTES
Patient ID: Edmund Matthews is a 64 y.o. male.  Referring Physician: Heber Daley MD PhD  [id number] [place] [address]  Primary Care Provider: Mp Bee MD      Subjective    The patient has been in excellent health, but was recently diagnosed with early onset Alzheimer's. He is not on treatments, but chose to retire early. On 7/3/2024 he noted swelling on the left side of the nose and cheek bone. No pain. This was quickly worked up, including a visit with Dr. Mitchell of [facility], and biopsy on 8/9/2024, showing   A: MAXILLA, BIOPSY:  --  LIMITED SAMPLE WITH EXTENSIVE CRUSH ARTIFACT; FINDINGS CONCERNING FOR LARGE B-CELL LYMPHOMA, FINAL CLASSIFICATION PENDING GENETIC STUDIES.  SUBTYPE BY BUD CRITERIA: Germinal center type (CD10-, BCL6+, MUM1-).   MYC/BCL2 EXPRESSOR PHENOTYPE: Not a double expressor (BCL2+, C-MYC-).    Today he feels well. Feels mild tingling sensation in the left face. No fever wt loss or night sweats. No pain or headache. No change in vision or swallowing. Memory deficit is mild and stable.          Review of Systems   Constitutional: Negative.    HENT:   Positive for lump/mass.    Eyes: Negative.    Respiratory: Negative.     Cardiovascular: Negative.    Gastrointestinal: Negative.    Endocrine: Negative.    Genitourinary: Negative.     Musculoskeletal: Negative.    Skin: Negative.    Neurological: Negative.    Hematological: Negative.    Psychiatric/Behavioral: Negative.          Objective   BSA: 1.99 meters squared  /74 (BP Location: Left arm, Patient Position: Sitting, BP Cuff Size: Adult)   Pulse 57   Temp 36.1 °C (97 °F) (Skin)   Resp 16   Wt 77.7 kg (171 lb 3.2 oz)   SpO2 99%   BMI 23.09 kg/m²     No family history on file.  Oncology History   Diffuse large B-cell lymphoma of lymph nodes of multiple regions (Multi)   8/30/2024 Initial Diagnosis    Diffuse large B-cell lymphoma of lymph nodes of multiple regions (Multi)     9/11/2024 -  Chemotherapy    R-CHOP  (Cyclophosphamide / DOXOrubicin / VinCRIStine / PredniSONE) + RiTUXimab, 21 Day Cycles         Edmund Matthews  reports that he has never smoked. He has never used smokeless tobacco.  He  has no history on file for alcohol use.  He  has no history on file for drug use.    Physical Exam  Constitutional:       General: He is not in acute distress.     Appearance: He is not toxic-appearing.   HENT:      Head: Normocephalic.      Nose: Nose normal.      Comments: A mass without clear border is appreciated between the left side of nose and the cheekbone. Non tender.      Mouth/Throat:      Mouth: Mucous membranes are moist.   Eyes:      Extraocular Movements: Extraocular movements intact.      Pupils: Pupils are equal, round, and reactive to light.   Cardiovascular:      Rate and Rhythm: Normal rate and regular rhythm.      Heart sounds: No murmur heard.  Pulmonary:      Effort: Pulmonary effort is normal.      Breath sounds: Normal breath sounds.   Abdominal:      General: Bowel sounds are normal.      Palpations: Abdomen is soft. There is no mass.      Tenderness: There is no abdominal tenderness. There is no rebound.   Musculoskeletal:         General: No swelling, tenderness, deformity or signs of injury.      Right lower leg: No edema.      Left lower leg: No edema.   Skin:     Coloration: Skin is not jaundiced.      Findings: No bruising, lesion or rash.   Neurological:      Mental Status: He is alert and oriented to person, place, and time.      Cranial Nerves: No cranial nerve deficit.      Motor: No weakness.      Gait: Gait normal.   Psychiatric:         Mood and Affect: Mood normal.         Performance Status:  Asymptomatic    Assessment/Plan      A/P  ##DLBCL, newly dx, 8/9/2024:  - A: MAXILLA, BIOPSY:  --  LIMITED SAMPLE WITH EXTENSIVE CRUSH ARTIFACT; FINDINGS CONCERNING FOR LARGE B-CELL LYMPHOMA, FINAL CLASSIFICATION PENDING GENETIC STUDIES.  SUBTYPE BY BUD CRITERIA: Germinal center type (CD10-, BCL6+,  MUM1-).   MYC/BCL2 EXPRESSOR PHENOTYPE: Not a double expressor (BCL2+, C-MYC-).  -Stage IV due to extranodal involvement (sinus, bone, premasillary soft tissue, orbit, nose) as shown below in the PET of 8/29:  Intensely hypermetabolic mass centered at the left maxilla causing osseous erosion and extending  into the left pre maxillary soft tissues, with involvement of the inferior margin of the left orbit and left lateral nose, as well as the left maxillary sinus with SUV max of 8.4.  -Tumor burden is relatively low, but location is high risk.  -Will need to assess orbits.   -For stage IV DLBCL, will need systemic chemo. R-CHOP is considered a standard. Furthermore, the MARTINEZ 3 is also a reasonable or preferred option, as it randomize RCHOP vs RCHOP + Odronextamab. He is interested in the study. Research staff is on site to help and will follow up on eligibility.   -9/5: due to existing Alzheimer's disease, he is not eligible for the above study. Will pursue the SOC, I.e. RCHOP. The pre-planned treatment is 6 cycles. Will need to complete work up.     Plan 9/5:  -labs including HBV.   -echo.   -MRI to evaluate orbits della left orbit.  -RCHOP 9/11. Count check + mal hem BEBETO qw.   -RTC 3-4 w with MD.       Time spent: 45 min.                Heber Daley MD PhD

## 2024-09-08 LAB
AORTIC VALVE MEAN GRADIENT: 3.3 MMHG
AORTIC VALVE PEAK VELOCITY: 1.24 M/S
AV PEAK GRADIENT: 6.2 MMHG
AVA (PEAK VEL): 3.85 CM2
AVA (VTI): 3.8 CM2
EJECTION FRACTION APICAL 4 CHAMBER: 34.1
EJECTION FRACTION: 63 %
GLOBAL LONGITUDINAL STRAIN: 15 %
LEFT ATRIUM VOLUME AREA LENGTH INDEX BSA: 24 ML/M2
LEFT VENTRICLE INTERNAL DIMENSION DIASTOLE: 5.23 CM (ref 3.5–6)
LEFT VENTRICULAR OUTFLOW TRACT DIAMETER: 2.58 CM
LV EJECTION FRACTION BIPLANE: 35 %
MITRAL VALVE E/A RATIO: 1.6
RIGHT VENTRICLE FREE WALL PEAK S': 16.36 CM/S
RIGHT VENTRICLE PEAK SYSTOLIC PRESSURE: 32.8 MMHG
TRICUSPID ANNULAR PLANE SYSTOLIC EXCURSION: 2.4 CM

## 2024-09-09 ENCOUNTER — LAB (OUTPATIENT)
Dept: LAB | Facility: LAB | Age: 64
End: 2024-09-09
Payer: MEDICARE

## 2024-09-09 ENCOUNTER — HOSPITAL ENCOUNTER (OUTPATIENT)
Dept: RADIOLOGY | Facility: CLINIC | Age: 64
Discharge: HOME | End: 2024-09-09
Payer: MEDICARE

## 2024-09-09 DIAGNOSIS — C83.38 DIFFUSE LARGE B-CELL LYMPHOMA OF LYMPH NODES OF MULTIPLE REGIONS (MULTI): ICD-10-CM

## 2024-09-09 DIAGNOSIS — C83.31 DIFFUSE LARGE B-CELL LYMPHOMA OF LYMPH NODES OF HEAD (MULTI): ICD-10-CM

## 2024-09-09 LAB
ALBUMIN SERPL BCP-MCNC: 4.2 G/DL (ref 3.4–5)
ALP SERPL-CCNC: 61 U/L (ref 33–136)
ALT SERPL W P-5'-P-CCNC: 13 U/L (ref 10–52)
ANION GAP SERPL CALC-SCNC: 11 MMOL/L (ref 10–20)
AST SERPL W P-5'-P-CCNC: 16 U/L (ref 9–39)
BASOPHILS # BLD AUTO: 0.03 X10*3/UL (ref 0–0.1)
BASOPHILS NFR BLD AUTO: 0.7 %
BILIRUB SERPL-MCNC: 0.7 MG/DL (ref 0–1.2)
BUN SERPL-MCNC: 23 MG/DL (ref 6–23)
CALCIUM SERPL-MCNC: 9.3 MG/DL (ref 8.6–10.3)
CHLORIDE SERPL-SCNC: 105 MMOL/L (ref 98–107)
CO2 SERPL-SCNC: 29 MMOL/L (ref 21–32)
CREAT SERPL-MCNC: 1.21 MG/DL (ref 0.5–1.3)
EGFRCR SERPLBLD CKD-EPI 2021: 66 ML/MIN/1.73M*2
EOSINOPHIL # BLD AUTO: 0 X10*3/UL (ref 0–0.7)
EOSINOPHIL NFR BLD AUTO: 0 %
ERYTHROCYTE [DISTWIDTH] IN BLOOD BY AUTOMATED COUNT: 14 % (ref 11.5–14.5)
GLUCOSE SERPL-MCNC: 80 MG/DL (ref 74–99)
HBV CORE AB SER QL: NONREACTIVE
HBV SURFACE AB SER-ACNC: <3.1 MIU/ML
HBV SURFACE AG SERPL QL IA: NONREACTIVE
HCT VFR BLD AUTO: 43.4 % (ref 41–52)
HGB BLD-MCNC: 13.5 G/DL (ref 13.5–17.5)
IMM GRANULOCYTES # BLD AUTO: 0.01 X10*3/UL (ref 0–0.7)
IMM GRANULOCYTES NFR BLD AUTO: 0.2 % (ref 0–0.9)
LDH SERPL L TO P-CCNC: 173 U/L (ref 84–246)
LYMPHOCYTES # BLD AUTO: 1.11 X10*3/UL (ref 1.2–4.8)
LYMPHOCYTES NFR BLD AUTO: 26.2 %
MCH RBC QN AUTO: 29.7 PG (ref 26–34)
MCHC RBC AUTO-ENTMCNC: 31.1 G/DL (ref 32–36)
MCV RBC AUTO: 95 FL (ref 80–100)
MONOCYTES # BLD AUTO: 0.31 X10*3/UL (ref 0.1–1)
MONOCYTES NFR BLD AUTO: 7.3 %
NEUTROPHILS # BLD AUTO: 2.78 X10*3/UL (ref 1.2–7.7)
NEUTROPHILS NFR BLD AUTO: 65.6 %
NRBC BLD-RTO: 0 /100 WBCS (ref 0–0)
PLATELET # BLD AUTO: 213 X10*3/UL (ref 150–450)
POTASSIUM SERPL-SCNC: 4.8 MMOL/L (ref 3.5–5.3)
PROT SERPL-MCNC: 6.6 G/DL (ref 6.4–8.2)
RBC # BLD AUTO: 4.55 X10*6/UL (ref 4.5–5.9)
SODIUM SERPL-SCNC: 140 MMOL/L (ref 136–145)
URATE SERPL-MCNC: 6.2 MG/DL (ref 4–7.5)
WBC # BLD AUTO: 4.2 X10*3/UL (ref 4.4–11.3)

## 2024-09-09 PROCEDURE — 80053 COMPREHEN METABOLIC PANEL: CPT

## 2024-09-09 PROCEDURE — 83615 LACTATE (LD) (LDH) ENZYME: CPT

## 2024-09-09 PROCEDURE — 87340 HEPATITIS B SURFACE AG IA: CPT

## 2024-09-09 PROCEDURE — 86704 HEP B CORE ANTIBODY TOTAL: CPT

## 2024-09-09 PROCEDURE — 36415 COLL VENOUS BLD VENIPUNCTURE: CPT

## 2024-09-09 PROCEDURE — 85025 COMPLETE CBC W/AUTO DIFF WBC: CPT

## 2024-09-09 PROCEDURE — 70543 MRI ORBT/FAC/NCK W/O &W/DYE: CPT

## 2024-09-09 PROCEDURE — 84550 ASSAY OF BLOOD/URIC ACID: CPT

## 2024-09-09 PROCEDURE — 86706 HEP B SURFACE ANTIBODY: CPT

## 2024-09-09 PROCEDURE — A9575 INJ GADOTERATE MEGLUMI 0.1ML: HCPCS | Performed by: INTERNAL MEDICINE

## 2024-09-09 PROCEDURE — 2550000001 HC RX 255 CONTRASTS: Performed by: INTERNAL MEDICINE

## 2024-09-09 RX ORDER — GADOTERATE MEGLUMINE 376.9 MG/ML
0.2 INJECTION INTRAVENOUS
Status: COMPLETED | OUTPATIENT
Start: 2024-09-09 | End: 2024-09-09

## 2024-09-11 ENCOUNTER — INFUSION (OUTPATIENT)
Dept: HEMATOLOGY/ONCOLOGY | Facility: HOSPITAL | Age: 64
End: 2024-09-11
Payer: MEDICARE

## 2024-09-11 VITALS
HEIGHT: 72 IN | RESPIRATION RATE: 15 BRPM | OXYGEN SATURATION: 100 % | TEMPERATURE: 99.3 F | BODY MASS INDEX: 23.32 KG/M2 | DIASTOLIC BLOOD PRESSURE: 59 MMHG | WEIGHT: 172.18 LBS | SYSTOLIC BLOOD PRESSURE: 123 MMHG | HEART RATE: 66 BPM

## 2024-09-11 DIAGNOSIS — C83.38 DIFFUSE LARGE B-CELL LYMPHOMA OF LYMPH NODES OF MULTIPLE REGIONS (MULTI): ICD-10-CM

## 2024-09-11 PROCEDURE — 96367 TX/PROPH/DG ADDL SEQ IV INF: CPT

## 2024-09-11 PROCEDURE — 96415 CHEMO IV INFUSION ADDL HR: CPT

## 2024-09-11 PROCEDURE — 96417 CHEMO IV INFUS EACH ADDL SEQ: CPT

## 2024-09-11 PROCEDURE — 2500000001 HC RX 250 WO HCPCS SELF ADMINISTERED DRUGS (ALT 637 FOR MEDICARE OP): Performed by: INTERNAL MEDICINE

## 2024-09-11 PROCEDURE — 96375 TX/PRO/DX INJ NEW DRUG ADDON: CPT | Mod: INF

## 2024-09-11 PROCEDURE — 2500000004 HC RX 250 GENERAL PHARMACY W/ HCPCS (ALT 636 FOR OP/ED): Performed by: INTERNAL MEDICINE

## 2024-09-11 PROCEDURE — 2500000004 HC RX 250 GENERAL PHARMACY W/ HCPCS (ALT 636 FOR OP/ED): Mod: JZ | Performed by: INTERNAL MEDICINE

## 2024-09-11 PROCEDURE — 96411 CHEMO IV PUSH ADDL DRUG: CPT

## 2024-09-11 PROCEDURE — 96413 CHEMO IV INFUSION 1 HR: CPT

## 2024-09-11 RX ORDER — EPINEPHRINE 0.3 MG/.3ML
0.3 INJECTION SUBCUTANEOUS EVERY 5 MIN PRN
Status: DISCONTINUED | OUTPATIENT
Start: 2024-09-11 | End: 2024-09-11 | Stop reason: HOSPADM

## 2024-09-11 RX ORDER — ACETAMINOPHEN 325 MG/1
650 TABLET ORAL ONCE
Status: COMPLETED | OUTPATIENT
Start: 2024-09-11 | End: 2024-09-11

## 2024-09-11 RX ORDER — FAMOTIDINE 10 MG/ML
20 INJECTION INTRAVENOUS ONCE AS NEEDED
Status: COMPLETED | OUTPATIENT
Start: 2024-09-11 | End: 2024-09-11

## 2024-09-11 RX ORDER — DIPHENHYDRAMINE HYDROCHLORIDE 50 MG/ML
50 INJECTION INTRAMUSCULAR; INTRAVENOUS AS NEEDED
Status: COMPLETED | OUTPATIENT
Start: 2024-09-11 | End: 2024-09-11

## 2024-09-11 RX ORDER — ALBUTEROL SULFATE 0.83 MG/ML
3 SOLUTION RESPIRATORY (INHALATION) AS NEEDED
Status: DISCONTINUED | OUTPATIENT
Start: 2024-09-11 | End: 2024-09-11 | Stop reason: HOSPADM

## 2024-09-11 RX ORDER — OLANZAPINE 5 MG/1
5 TABLET ORAL NIGHTLY
Qty: 4 TABLET | Refills: 5 | Status: SHIPPED | OUTPATIENT
Start: 2024-09-11

## 2024-09-11 RX ORDER — PREDNISONE 50 MG/1
TABLET ORAL
Qty: 10 TABLET | Refills: 5 | Status: SHIPPED | OUTPATIENT
Start: 2024-09-11

## 2024-09-11 RX ORDER — PALONOSETRON 0.05 MG/ML
0.25 INJECTION, SOLUTION INTRAVENOUS ONCE
Status: COMPLETED | OUTPATIENT
Start: 2024-09-11 | End: 2024-09-11

## 2024-09-11 RX ORDER — DOXORUBICIN HYDROCHLORIDE 2 MG/ML
50 INJECTION, SOLUTION INTRAVENOUS ONCE
Status: COMPLETED | OUTPATIENT
Start: 2024-09-11 | End: 2024-09-11

## 2024-09-11 RX ORDER — PROCHLORPERAZINE EDISYLATE 5 MG/ML
10 INJECTION INTRAMUSCULAR; INTRAVENOUS EVERY 6 HOURS PRN
Status: DISCONTINUED | OUTPATIENT
Start: 2024-09-11 | End: 2024-09-11 | Stop reason: HOSPADM

## 2024-09-11 RX ORDER — DIPHENHYDRAMINE HCL 50 MG
50 CAPSULE ORAL ONCE
Status: COMPLETED | OUTPATIENT
Start: 2024-09-11 | End: 2024-09-11

## 2024-09-11 RX ORDER — ONDANSETRON HYDROCHLORIDE 8 MG/1
8 TABLET, FILM COATED ORAL EVERY 8 HOURS PRN
Qty: 30 TABLET | Refills: 5 | Status: SHIPPED | OUTPATIENT
Start: 2024-09-11

## 2024-09-11 RX ORDER — ALLOPURINOL 300 MG/1
300 TABLET ORAL DAILY
Qty: 30 TABLET | Refills: 0 | Status: SHIPPED | OUTPATIENT
Start: 2024-09-11 | End: 2024-10-11

## 2024-09-11 RX ORDER — PROCHLORPERAZINE MALEATE 10 MG
10 TABLET ORAL EVERY 6 HOURS PRN
Qty: 30 TABLET | Refills: 5 | Status: SHIPPED | OUTPATIENT
Start: 2024-09-11

## 2024-09-11 RX ORDER — PROCHLORPERAZINE MALEATE 10 MG
10 TABLET ORAL EVERY 6 HOURS PRN
Status: DISCONTINUED | OUTPATIENT
Start: 2024-09-11 | End: 2024-09-11 | Stop reason: HOSPADM

## 2024-09-11 ASSESSMENT — PAIN SCALES - GENERAL
PAINLEVEL: 0-NO PAIN
PAINLEVEL_OUTOF10: 0-NO PAIN

## 2024-09-11 NOTE — PROGRESS NOTES
Cycle 1 Day 1 Rituximab rates calculated and verified with ENID Truong RN and are as follows, each over 30 minutes per order:    Rate Volume to be infused   23 11.5   46 23   69 34.5   92 46   115 57.5   138 69   161 80.5   184 Remaining volume      JEWEL Schwarz RN

## 2024-09-11 NOTE — SIGNIFICANT EVENT
"Verified height by JEWEL Schwarz RN and MELVA White RN   09/11/24 0837   Encounter Vitals   Height (S)  1.837 m (6' 0.32\")   Height and Weight   Weight in (lb) to have BMI = 25 185.6       "

## 2024-09-11 NOTE — PROGRESS NOTES
Adverse Event Note     Name:Edmund Matthews  : 1960  MRN: [id number]      Adverse Event:  Medication: rituxan  Administered Date/Time: 2024 1118  Reactions/Symptoms Started Time: 1256   Symptoms: (check all that apply)  [] Back Pain   [] Erythema Face     [] Hypotension     [x] Rigors        [x] Tickle in throat  [] Bleeding    [] Erythema Hands  [] Itching               [] Swelling/Edema [] Unknown  [] Chest Pain [] Hives/Urticaria     [] Low platelet Ct  [] Syncope  [] Cytopenia  [] Hypertension       [] Neutropenia         Severity: Mild   Provider Notified: Yes   Medications Given(Add all medications to the chart via , or treatment plan)  [] Acetaminophen-Tylenol       [x] Famotidine-Pepcid [] Nitroglycerine-NTG  [] Albuterol                               [] Hydrocortisone      [] Ondansetron-Zofran  [x] Diphenhydramine-Benadryl [] Lorazepam-Ativan  [] Naloxone-Narcan  [] Epinephrine-Epi                    [x] Methylprednisone   Additional Details/ Comments:   Slight rigors  Slight tickle in throat/ mild cough    Provider (Jaz Chua CNP) at chairside 1257    Restarted Rituximab at 69 ml/hr rate once rigors resolved. Pt tolerated remainder of infusion and titrations without incident.

## 2024-09-11 NOTE — PROGRESS NOTES
[facility] Infusion Nursing Note  09/11/24    Edmund Matthews is a 64 y.o. year old male patient presenting to outpatient infusion for cycle 1 day 1 of the following regimen:    Treatment Plans       Name Type Plan Dates Plan Provider         Active    R-CHOP (Cyclophosphamide / DOXOrubicin / VinCRIStine / PredniSONE) + RiTUXimab, 21 Day Cycles Oncology Treatment  9/10/2024 - Present Heber Daley MD PhD                  Since the last visit, he reports doing well. Overall, he states that energy level is energy level is good and able to perform all ADLs. Appetite has been good. 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    Pt reacted when rate was being increased from 69 to 92 ml/hr. See ADR note. Restarted Rituximab at 69 ml/hr rate once rigors resolved. Pt tolerated remainder of infusion and titrations without incident.     Line type: PIV- 20 L FA x1  Line removed/maintained prior to discharge: removed.     Administrations This Visit       acetaminophen (Tylenol) tablet 650 mg       Admin Date  09/11/2024 Action  Given Dose  650 mg Route  oral Documented By  Sabiha Schwarz RN              cycloPHOSphamide (Cytoxan) 1,500 mg in sodium chloride 0.9% 342 mL IV       Admin Date  09/11/2024 Action  New Bag Dose  1,500 mg Rate  684 mL/hr Route  intravenous Documented By  Sabiha Schwarz RN              diphenhydrAMINE (BENADryl) capsule 50 mg       Admin Date  09/11/2024 Action  Given Dose  50 mg Route  oral Documented By  Sabiha Schwarz RN              diphenhydrAMINE (BENADryl) injection 50 mg       Admin Date  09/11/2024 Action  Given Dose  25 mg Route  intravenous Documented By  Sabiha Schwarz RN              DOXOrubicin (Adriamycin) IV syringe 50 mg/m2 = 100 mg 50 mL       Admin Date  09/11/2024 Action  Given Dose  100 mg Route  intravenous Documented By  Sabiha Schwarz RN               famotidine PF (Pepcid) injection 20 mg       Admin Date  09/11/2024 Action  Given Dose  20 mg Route  intravenous Documented By  Sabiha Schwarz RN              fosaprepitant (Emend) 150 mg in sodium chloride 0.9% 250 mL IV       Admin Date  09/11/2024 Action  New Bag Dose  150 mg Route  intravenous Documented By  Sabiha Schwarz RN              methylPREDNISolone sod succinate (SOLU-Medrol) 40 mg/mL injection 40 mg       Admin Date  09/11/2024 Action  Given Dose  40 mg Route  intravenous Documented By  Sabiha Schwarz RN              palonosetron (Aloxi) injection 250 mcg       Admin Date  09/11/2024 Action  Given Dose  250 mcg Route  intravenous Documented By  Sabiha Schwarz RN              riTUXimab-pvvr (Ruxience) 746 mg in sodium chloride 0.9% 341.6 mL IV       Admin Date  09/11/2024 Action  New Bag Dose  746 mg Rate  23 mL/hr Route  intravenous Documented By  Sabiha Schwarz RN               Admin Date  09/11/2024 Action  Rate/Dose Change Dose   Rate  46 mL/hr Route  intravenous Documented By  Sabiha Schwarz RN               Admin Date  09/11/2024 Action  Rate/Dose Change Dose   Rate  69 mL/hr Route  intravenous Documented By  Sabiha Schwarz RN               Admin Date  09/11/2024 Action  Restarted Dose   Rate  69 mL/hr Route  intravenous Documented By  Sabiha Schwarz RN               Admin Date  09/11/2024 Action  Rate/Dose Change Dose   Rate  92 mL/hr Route  intravenous Documented By  Julianne Hammond RN               Admin Date  09/11/2024 Action  Rate/Dose Change Dose   Rate  115 mL/hr Route  intravenous Documented By  Sabiha Schwarz RN               Admin Date  09/11/2024 Action  Rate/Dose Change Dose   Rate  138 mL/hr Route  intravenous Documented By  Sabiha Schwarz RN               Admin Date  09/11/2024 Action  Rate/Dose Change Dose   Rate  161 mL/hr Route  intravenous Documented By  Rosi Jackson RN              vinCRIStine (Oncovin) 2 mg in sodium chloride 0.9% 60  mL IV       Admin Date  09/11/2024 Action  New Bag Dose  2 mg Route  intravenous Documented By  Sabiha Schwarz RN                  Hypersensitivity reaction noted: Yes - see ADR note  Patient tolerated treatment fair. Discharged home in stable condition.    Follow-up Plan: 9/18    Sabiha Schwarz RN

## 2024-09-18 ENCOUNTER — LAB (OUTPATIENT)
Dept: HEMATOLOGY/ONCOLOGY | Facility: HOSPITAL | Age: 64
End: 2024-09-18
Payer: MEDICARE

## 2024-09-18 ENCOUNTER — OFFICE VISIT (OUTPATIENT)
Dept: HEMATOLOGY/ONCOLOGY | Facility: HOSPITAL | Age: 64
End: 2024-09-18
Payer: MEDICARE

## 2024-09-18 VITALS
WEIGHT: 174.38 LBS | BODY MASS INDEX: 23.44 KG/M2 | HEART RATE: 74 BPM | RESPIRATION RATE: 19 BRPM | DIASTOLIC BLOOD PRESSURE: 76 MMHG | SYSTOLIC BLOOD PRESSURE: 133 MMHG | TEMPERATURE: 97.9 F | OXYGEN SATURATION: 100 %

## 2024-09-18 DIAGNOSIS — C83.38 DIFFUSE LARGE B-CELL LYMPHOMA OF LYMPH NODES OF MULTIPLE REGIONS (MULTI): ICD-10-CM

## 2024-09-18 DIAGNOSIS — C83.38 DIFFUSE LARGE B-CELL LYMPHOMA OF LYMPH NODES OF MULTIPLE REGIONS (MULTI): Primary | ICD-10-CM

## 2024-09-18 LAB
ALBUMIN SERPL BCP-MCNC: 3.8 G/DL (ref 3.4–5)
ALP SERPL-CCNC: 54 U/L (ref 33–136)
ALT SERPL W P-5'-P-CCNC: 13 U/L (ref 10–52)
ANION GAP SERPL CALC-SCNC: 12 MMOL/L (ref 10–20)
AST SERPL W P-5'-P-CCNC: 13 U/L (ref 9–39)
BASOPHILS # BLD MANUAL: 0.05 X10*3/UL (ref 0–0.1)
BASOPHILS NFR BLD MANUAL: 2 %
BILIRUB SERPL-MCNC: 0.6 MG/DL (ref 0–1.2)
BUN SERPL-MCNC: 22 MG/DL (ref 6–23)
BURR CELLS BLD QL SMEAR: ABNORMAL
CALCIUM SERPL-MCNC: 9.1 MG/DL (ref 8.6–10.3)
CHLORIDE SERPL-SCNC: 104 MMOL/L (ref 98–107)
CO2 SERPL-SCNC: 26 MMOL/L (ref 21–32)
CREAT SERPL-MCNC: 1.03 MG/DL (ref 0.5–1.3)
DACRYOCYTES BLD QL SMEAR: ABNORMAL
EGFRCR SERPLBLD CKD-EPI 2021: 80 ML/MIN/1.73M*2
EOSINOPHIL # BLD MANUAL: 0.1 X10*3/UL (ref 0–0.7)
EOSINOPHIL NFR BLD MANUAL: 4 %
ERYTHROCYTE [DISTWIDTH] IN BLOOD BY AUTOMATED COUNT: 12.9 % (ref 11.5–14.5)
GLUCOSE SERPL-MCNC: 131 MG/DL (ref 74–99)
HCT VFR BLD AUTO: 36.4 % (ref 41–52)
HGB BLD-MCNC: 12.2 G/DL (ref 13.5–17.5)
IMM GRANULOCYTES # BLD AUTO: 0.12 X10*3/UL (ref 0–0.7)
IMM GRANULOCYTES NFR BLD AUTO: 5.1 % (ref 0–0.9)
LYMPHOCYTES # BLD MANUAL: 0.34 X10*3/UL (ref 1.2–4.8)
LYMPHOCYTES NFR BLD MANUAL: 14 %
MCH RBC QN AUTO: 30.4 PG (ref 26–34)
MCHC RBC AUTO-ENTMCNC: 33.5 G/DL (ref 32–36)
MCV RBC AUTO: 91 FL (ref 80–100)
MONOCYTES # BLD MANUAL: 0 X10*3/UL (ref 0.1–1)
MONOCYTES NFR BLD MANUAL: 0 %
NEUTROPHILS # BLD MANUAL: 1.92 X10*3/UL (ref 1.2–7.7)
NEUTS BAND # BLD MANUAL: 0.07 X10*3/UL (ref 0–0.7)
NEUTS BAND NFR BLD MANUAL: 3 %
NEUTS SEG # BLD MANUAL: 1.85 X10*3/UL (ref 1.2–7)
NEUTS SEG NFR BLD MANUAL: 77 %
NRBC BLD-RTO: 0 /100 WBCS (ref 0–0)
OVALOCYTES BLD QL SMEAR: ABNORMAL
PLATELET # BLD AUTO: 154 X10*3/UL (ref 150–450)
POTASSIUM SERPL-SCNC: 3.9 MMOL/L (ref 3.5–5.3)
PROT SERPL-MCNC: 6.2 G/DL (ref 6.4–8.2)
RBC # BLD AUTO: 4.01 X10*6/UL (ref 4.5–5.9)
RBC MORPH BLD: ABNORMAL
SODIUM SERPL-SCNC: 138 MMOL/L (ref 136–145)
TOTAL CELLS COUNTED BLD: 100
WBC # BLD AUTO: 2.4 X10*3/UL (ref 4.4–11.3)

## 2024-09-18 PROCEDURE — 99214 OFFICE O/P EST MOD 30 MIN: CPT | Performed by: NURSE PRACTITIONER

## 2024-09-18 PROCEDURE — 36415 COLL VENOUS BLD VENIPUNCTURE: CPT

## 2024-09-18 PROCEDURE — 1036F TOBACCO NON-USER: CPT | Performed by: NURSE PRACTITIONER

## 2024-09-18 PROCEDURE — 80053 COMPREHEN METABOLIC PANEL: CPT | Performed by: NURSE PRACTITIONER

## 2024-09-18 PROCEDURE — 1126F AMNT PAIN NOTED NONE PRSNT: CPT | Performed by: NURSE PRACTITIONER

## 2024-09-18 PROCEDURE — 1159F MED LIST DOCD IN RCRD: CPT | Performed by: NURSE PRACTITIONER

## 2024-09-18 PROCEDURE — 1157F ADVNC CARE PLAN IN RCRD: CPT | Performed by: NURSE PRACTITIONER

## 2024-09-18 PROCEDURE — 1160F RVW MEDS BY RX/DR IN RCRD: CPT | Performed by: NURSE PRACTITIONER

## 2024-09-18 PROCEDURE — 85027 COMPLETE CBC AUTOMATED: CPT | Performed by: NURSE PRACTITIONER

## 2024-09-18 PROCEDURE — 85007 BL SMEAR W/DIFF WBC COUNT: CPT | Performed by: NURSE PRACTITIONER

## 2024-09-18 ASSESSMENT — ENCOUNTER SYMPTOMS
BLOOD IN STOOL: 0
UNEXPECTED WEIGHT CHANGE: 0
NUMBNESS: 0
FEVER: 0
CONSTIPATION: 0
DIARRHEA: 0
COUGH: 0
LIGHT-HEADEDNESS: 0
DYSURIA: 0
SHORTNESS OF BREATH: 0
CHILLS: 0
CHEST TIGHTNESS: 0
ABDOMINAL PAIN: 0
DIZZINESS: 0
HEADACHES: 0
HEMATURIA: 0
VOMITING: 0
NAUSEA: 0

## 2024-09-18 ASSESSMENT — PAIN SCALES - GENERAL: PAINLEVEL: 0-NO PAIN

## 2024-09-18 NOTE — PROGRESS NOTES
Patient ID: Edmund Matthews is a 64 y.o. male.  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  SUBTYPE BY BUD CRITERIA: Germinal center type (CD10-, BCL6+, MUM1-).   MYC/BCL2 EXPRESSOR PHENOTYPE: Not a double expressor (BCL2+, C-MYC-).  -Stage IV due to extranodal involvement (sinus, bone, premasillary soft tissue, orbit, nose) as shown below in the PET of 8/29:  Intensely hypermetabolic mass centered at the left maxilla causing osseous erosion and extending  into the left pre maxillary soft tissues, with involvement of the inferior margin of the left orbit and left lateral nose, as well as the left maxillary sinus with SUV max of 8.4.  -Tumor burden is relatively low, but location is high risk.  -Will need to assess orbits.   -For stage IV DLBCL, will need systemic chemo. R-CHOP is considered a standard. Furthermore, the MARTINEZ 3 is also a reasonable or preferred option, as it randomize RCHOP vs RCHOP + Odronextamab. He is interested in the study. Research staff is on site to help and will follow up on eligibility.   -9/5: due to existing Alzheimer's disease, he is not eligible for the above study. Will pursue the SOC, I.e. RCHOP. The pre-planned treatment is 6 cycles. Will need to complete work up.   - 9/18:  nodule noted on left wrist.  Area outlined for monitoring.  Likely due to IV placement.  Will monitor     Plan 9/18:  -C2 RCHOP planned for 10/2/24. Count check + mal hem BEBETO qw.   -Dr. Daley follow up 9/26/24    Oncology History   Diffuse large B-cell lymphoma of lymph nodes of multiple regions (Multi)    8/30/2024 Initial Diagnosis    Diffuse large B-cell lymphoma of lymph nodes of multiple regions (Multi)     9/11/2024 -  Chemotherapy    R-CHOP (Cyclophosphamide / DOXOrubicin / VinCRIStine / PredniSONE) + RiTUXimab, 21 Day Cycles        SUBJECTIVE:  Patient presents today, accompanied by his wife, for follow up.   Reports feeling well.  He remains active.  Eating and drinking well.  He did report hard stools.  Last bowel movement was last night.  Yesterday he noticed a lump on his left wrist area.  Not painful or itching.       Review of Systems   Constitutional:  Negative for chills, fever and unexpected weight change.   HENT:  Negative for mouth sores and nosebleeds.    Respiratory:  Negative for cough, chest tightness and shortness of breath.    Cardiovascular:  Negative for chest pain and leg swelling.   Gastrointestinal:  Negative for abdominal pain, blood in stool, constipation, diarrhea, nausea and vomiting.   Genitourinary:  Negative for dysuria and hematuria.   Skin:  Negative for rash.   Neurological:  Negative for dizziness, light-headedness, numbness and headaches.     OBJECTIVE:  KPS: Karnofsky Score: 100 - Fully active, able to carry on all pre-disease performed without restriction   VS:  /76 (BP Location: Right arm, Patient Position: Sitting, BP Cuff Size: Adult)   Pulse 74   Temp 36.6 °C (97.9 °F) (Temporal)   Resp 19   Wt 79.1 kg (174 lb 6.1 oz)   SpO2 100%   BMI 23.44 kg/m²   BSA: 2.01 meters squared    Physical Exam  Constitutional:       Appearance: Normal appearance.   HENT:      Head: Normocephalic.   Eyes:      Pupils: Pupils are equal, round, and reactive to light.   Cardiovascular:      Rate and Rhythm: Normal rate and regular rhythm.   Pulmonary:      Effort: Pulmonary effort is normal.      Breath sounds: Normal breath sounds.   Abdominal:      General: Bowel sounds are normal.      Palpations: Abdomen is soft.   Musculoskeletal:         General: Normal range of motion.       Cervical back: Normal range of motion and neck supple.      Comments: 4 cm round raised area under the skin.  Soft and non tender.  No s/s of infection.    Lymphadenopathy:      Comments: No lymphadenopathy   Skin:     General: Skin is warm and dry.      Findings: No lesion or rash.   Neurological:      General: No focal deficit present.      Mental Status: He is alert and oriented to person, place, and time. Mental status is at baseline.      Comments: No numbness or tingling   Psychiatric:         Mood and Affect: Mood normal.       Current Outpatient Medications   Medication Instructions    allopurinol (ZYLOPRIM) 300 mg, oral, Daily    OLANZapine (ZYPREXA) 5 mg, oral, Nightly, For 4 days starting the evening of treatment.    ondansetron (ZOFRAN) 8 mg, oral, Every 8 hours PRN    predniSONE (Deltasone) 50 mg tablet Take 100 mg by mouth on Days 1, 2, 3, 4, and 5 of treatment cycle.    prochlorperazine (COMPAZINE) 10 mg, oral, Every 6 hours PRN      Laboratory:  The pertinent laboratory results were reviewed and discussed with the patient.    Lab Results   Component Value Date    WBC 2.4 (L) 09/18/2024    HCT 36.4 (L) 09/18/2024    HGB 12.2 (L) 09/18/2024     09/18/2024    K 3.9 09/18/2024    CALCIUM 9.1 09/18/2024     09/18/2024    BILITOT 0.6 09/18/2024    ALT 13 09/18/2024    AST 13 09/18/2024    BUN 22 09/18/2024    CREATININE 1.03 09/18/2024      Note: for a comprehensive list of the patient's lab results, access the Results Review activity.    RTC:  9/25 count check and mal heme follow up  9/26 Dr. Daley follow up    Ethel Plasencia, APRN-CNP

## 2024-09-18 NOTE — ASSESSMENT & PLAN NOTE
A/P  ##DLBCL, newly dx, 8/9/2024:  - A: MAXILLA, BIOPSY:  --  LIMITED SAMPLE WITH EXTENSIVE CRUSH ARTIFACT; FINDINGS CONCERNING FOR LARGE B-CELL LYMPHOMA, FINAL CLASSIFICATION PENDING GENETIC STUDIES.  SUBTYPE BY BUD CRITERIA: Germinal center type (CD10-, BCL6+, MUM1-).   MYC/BCL2 EXPRESSOR PHENOTYPE: Not a double expressor (BCL2+, C-MYC-).  -Stage IV due to extranodal involvement (sinus, bone, premasillary soft tissue, orbit, nose) as shown below in the PET of 8/29:  Intensely hypermetabolic mass centered at the left maxilla causing osseous erosion and extending  into the left pre maxillary soft tissues, with involvement of the inferior margin of the left orbit and left lateral nose, as well as the left maxillary sinus with SUV max of 8.4.  -Tumor burden is relatively low, but location is high risk.  -Will need to assess orbits.   -For stage IV DLBCL, will need systemic chemo. R-CHOP is considered a standard. Furthermore, the MARTINEZ 3 is also a reasonable or preferred option, as it randomize RCHOP vs RCHOP + Odronextamab. He is interested in the study. Research staff is on site to help and will follow up on eligibility.   -9/5: due to existing Alzheimer's disease, he is not eligible for the above study. Will pursue the SOC, I.e. RCHOP. The pre-planned treatment is 6 cycles. Will need to complete work up.   - 9/18:  nodule noted on left wrist.  Area outlined for monitoring.  Likely due to IV placement.  Will monitor     Plan 9/18:  -C2 RCHOP planned for 10/2/24. Count check + mal hem BEBETO qw.   -Dr. Daley follow up 9/26/24

## 2024-09-19 ENCOUNTER — APPOINTMENT (OUTPATIENT)
Dept: HEMATOLOGY/ONCOLOGY | Facility: HOSPITAL | Age: 64
End: 2024-09-19
Payer: MEDICARE

## 2024-09-25 ENCOUNTER — OFFICE VISIT (OUTPATIENT)
Dept: HEMATOLOGY/ONCOLOGY | Facility: HOSPITAL | Age: 64
End: 2024-09-25
Payer: MEDICARE

## 2024-09-25 ENCOUNTER — LAB (OUTPATIENT)
Dept: HEMATOLOGY/ONCOLOGY | Facility: HOSPITAL | Age: 64
End: 2024-09-25
Payer: MEDICARE

## 2024-09-25 VITALS
WEIGHT: 172.8 LBS | HEART RATE: 75 BPM | RESPIRATION RATE: 18 BRPM | SYSTOLIC BLOOD PRESSURE: 141 MMHG | DIASTOLIC BLOOD PRESSURE: 79 MMHG | OXYGEN SATURATION: 99 % | TEMPERATURE: 97.9 F | BODY MASS INDEX: 23.23 KG/M2

## 2024-09-25 DIAGNOSIS — C83.38 DIFFUSE LARGE B-CELL LYMPHOMA OF LYMPH NODES OF MULTIPLE REGIONS (MULTI): ICD-10-CM

## 2024-09-25 DIAGNOSIS — C83.38 DIFFUSE LARGE B-CELL LYMPHOMA OF LYMPH NODES OF MULTIPLE REGIONS (MULTI): Primary | ICD-10-CM

## 2024-09-25 LAB
ALBUMIN SERPL BCP-MCNC: 3.8 G/DL (ref 3.4–5)
ALP SERPL-CCNC: 54 U/L (ref 33–136)
ALT SERPL W P-5'-P-CCNC: 10 U/L (ref 10–52)
ANION GAP SERPL CALC-SCNC: 13 MMOL/L (ref 10–20)
AST SERPL W P-5'-P-CCNC: 13 U/L (ref 9–39)
BASOPHILS # BLD MANUAL: 0.02 X10*3/UL (ref 0–0.1)
BASOPHILS NFR BLD MANUAL: 2 %
BILIRUB SERPL-MCNC: 0.4 MG/DL (ref 0–1.2)
BUN SERPL-MCNC: 17 MG/DL (ref 6–23)
BURR CELLS BLD QL SMEAR: ABNORMAL
CALCIUM SERPL-MCNC: 9.1 MG/DL (ref 8.6–10.3)
CHLORIDE SERPL-SCNC: 104 MMOL/L (ref 98–107)
CO2 SERPL-SCNC: 24 MMOL/L (ref 21–32)
CREAT SERPL-MCNC: 1.1 MG/DL (ref 0.5–1.3)
EGFRCR SERPLBLD CKD-EPI 2021: 74 ML/MIN/1.73M*2
EOSINOPHIL # BLD MANUAL: 0.01 X10*3/UL (ref 0–0.7)
EOSINOPHIL NFR BLD MANUAL: 1 %
ERYTHROCYTE [DISTWIDTH] IN BLOOD BY AUTOMATED COUNT: 12.9 % (ref 11.5–14.5)
GLUCOSE SERPL-MCNC: 101 MG/DL (ref 74–99)
HCT VFR BLD AUTO: 34.8 % (ref 41–52)
HGB BLD-MCNC: 11.2 G/DL (ref 13.5–17.5)
IMM GRANULOCYTES # BLD AUTO: 0.01 X10*3/UL (ref 0–0.7)
IMM GRANULOCYTES NFR BLD AUTO: 1.1 % (ref 0–0.9)
LYMPHOCYTES # BLD MANUAL: 0.42 X10*3/UL (ref 1.2–4.8)
LYMPHOCYTES NFR BLD MANUAL: 47 %
MCH RBC QN AUTO: 30.1 PG (ref 26–34)
MCHC RBC AUTO-ENTMCNC: 32.2 G/DL (ref 32–36)
MCV RBC AUTO: 94 FL (ref 80–100)
MONOCYTES # BLD MANUAL: 0.1 X10*3/UL (ref 0.1–1)
MONOCYTES NFR BLD MANUAL: 11 %
NEUTROPHILS # BLD MANUAL: 0.34 X10*3/UL (ref 1.2–7.7)
NEUTS BAND # BLD MANUAL: 0.09 X10*3/UL (ref 0–0.7)
NEUTS BAND NFR BLD MANUAL: 10 %
NEUTS SEG # BLD MANUAL: 0.25 X10*3/UL (ref 1.2–7)
NEUTS SEG NFR BLD MANUAL: 28 %
NRBC BLD-RTO: 0 /100 WBCS (ref 0–0)
OVALOCYTES BLD QL SMEAR: ABNORMAL
PLATELET # BLD AUTO: 250 X10*3/UL (ref 150–450)
POLYCHROMASIA BLD QL SMEAR: ABNORMAL
POTASSIUM SERPL-SCNC: 4.1 MMOL/L (ref 3.5–5.3)
PROT SERPL-MCNC: 6.6 G/DL (ref 6.4–8.2)
RBC # BLD AUTO: 3.72 X10*6/UL (ref 4.5–5.9)
RBC MORPH BLD: ABNORMAL
SODIUM SERPL-SCNC: 137 MMOL/L (ref 136–145)
TOTAL CELLS COUNTED BLD: 100
VARIANT LYMPHS # BLD MANUAL: 0.01 X10*3/UL (ref 0–0.5)
VARIANT LYMPHS NFR BLD: 1 %
WBC # BLD AUTO: 0.9 X10*3/UL (ref 4.4–11.3)

## 2024-09-25 PROCEDURE — 85027 COMPLETE CBC AUTOMATED: CPT

## 2024-09-25 PROCEDURE — 1036F TOBACCO NON-USER: CPT | Performed by: NURSE PRACTITIONER

## 2024-09-25 PROCEDURE — 1157F ADVNC CARE PLAN IN RCRD: CPT | Performed by: NURSE PRACTITIONER

## 2024-09-25 PROCEDURE — 1160F RVW MEDS BY RX/DR IN RCRD: CPT | Performed by: NURSE PRACTITIONER

## 2024-09-25 PROCEDURE — 1159F MED LIST DOCD IN RCRD: CPT | Performed by: NURSE PRACTITIONER

## 2024-09-25 PROCEDURE — 84075 ASSAY ALKALINE PHOSPHATASE: CPT

## 2024-09-25 PROCEDURE — 99213 OFFICE O/P EST LOW 20 MIN: CPT | Performed by: NURSE PRACTITIONER

## 2024-09-25 PROCEDURE — 36415 COLL VENOUS BLD VENIPUNCTURE: CPT

## 2024-09-25 PROCEDURE — 96372 THER/PROPH/DIAG INJ SC/IM: CPT

## 2024-09-25 PROCEDURE — 85007 BL SMEAR W/DIFF WBC COUNT: CPT

## 2024-09-25 PROCEDURE — 2500000004 HC RX 250 GENERAL PHARMACY W/ HCPCS (ALT 636 FOR OP/ED): Mod: JZ | Performed by: NURSE PRACTITIONER

## 2024-09-25 RX ORDER — LEVOFLOXACIN 500 MG/1
500 TABLET, FILM COATED ORAL DAILY
Qty: 10 TABLET | Refills: 0 | Status: SHIPPED | OUTPATIENT
Start: 2024-09-25 | End: 2024-10-05

## 2024-09-25 RX ORDER — ALBUTEROL SULFATE 0.83 MG/ML
3 SOLUTION RESPIRATORY (INHALATION) AS NEEDED
OUTPATIENT
Start: 2024-09-26

## 2024-09-25 RX ORDER — DIPHENHYDRAMINE HYDROCHLORIDE 50 MG/ML
50 INJECTION INTRAMUSCULAR; INTRAVENOUS AS NEEDED
OUTPATIENT
Start: 2024-09-26

## 2024-09-25 RX ORDER — FAMOTIDINE 10 MG/ML
20 INJECTION INTRAVENOUS ONCE AS NEEDED
Status: CANCELLED | OUTPATIENT
Start: 2024-09-25

## 2024-09-25 RX ORDER — EPINEPHRINE 0.3 MG/.3ML
0.3 INJECTION SUBCUTANEOUS EVERY 5 MIN PRN
OUTPATIENT
Start: 2024-09-26

## 2024-09-25 RX ORDER — DIPHENHYDRAMINE HYDROCHLORIDE 50 MG/ML
50 INJECTION INTRAMUSCULAR; INTRAVENOUS AS NEEDED
Status: CANCELLED | OUTPATIENT
Start: 2024-09-25

## 2024-09-25 RX ORDER — ALBUTEROL SULFATE 0.83 MG/ML
3 SOLUTION RESPIRATORY (INHALATION) AS NEEDED
Status: CANCELLED | OUTPATIENT
Start: 2024-09-25

## 2024-09-25 RX ORDER — EPINEPHRINE 0.3 MG/.3ML
0.3 INJECTION SUBCUTANEOUS EVERY 5 MIN PRN
Status: CANCELLED | OUTPATIENT
Start: 2024-09-25

## 2024-09-25 RX ORDER — FAMOTIDINE 10 MG/ML
20 INJECTION INTRAVENOUS ONCE AS NEEDED
OUTPATIENT
Start: 2024-09-26

## 2024-09-25 ASSESSMENT — ENCOUNTER SYMPTOMS
CHILLS: 0
SHORTNESS OF BREATH: 0
FEVER: 0
VOMITING: 0
CHEST TIGHTNESS: 0
DYSURIA: 0
HEMATURIA: 0
BLOOD IN STOOL: 0
LIGHT-HEADEDNESS: 0
COUGH: 0
HEADACHES: 0
DIZZINESS: 0
ABDOMINAL PAIN: 0
DIARRHEA: 0
UNEXPECTED WEIGHT CHANGE: 0
NAUSEA: 0
CONSTIPATION: 0
NUMBNESS: 0

## 2024-09-25 ASSESSMENT — PAIN SCALES - GENERAL: PAINLEVEL: 0-NO PAIN

## 2024-09-25 NOTE — PROGRESS NOTES
Pt arrived to outpatient infusion for count check. No transfusion needs today. Pt received subcutaneous Zarxio injection in RADHA for WBC 0.9 and ANC <500. Pt tolerated injection and discharged home in stable condition.

## 2024-09-25 NOTE — ASSESSMENT & PLAN NOTE
newly dx, 8/9/2024:  - A: MAXILLA, BIOPSY:  --  LIMITED SAMPLE WITH EXTENSIVE CRUSH ARTIFACT; FINDINGS CONCERNING FOR LARGE B-CELL LYMPHOMA, FINAL CLASSIFICATION PENDING GENETIC STUDIES.  SUBTYPE BY BUD CRITERIA: Germinal center type (CD10-, BCL6+, MUM1-).   MYC/BCL2 EXPRESSOR PHENOTYPE: Not a double expressor (BCL2+, C-MYC-).  -Stage IV due to extranodal involvement (sinus, bone, premasillary soft tissue, orbit, nose) as shown below in the PET of 8/29:  Intensely hypermetabolic mass centered at the left maxilla causing osseous erosion and extending  into the left pre maxillary soft tissues, with involvement of the inferior margin of the left orbit and left lateral nose, as well as the left maxillary sinus with SUV max of 8.4.  -Tumor burden is relatively low, but location is high risk.  -Will need to assess orbits.   -For stage IV DLBCL, will need systemic chemo. R-CHOP is considered a standard. Furthermore, the MARTINEZ 3 is also a reasonable or preferred option, as it randomize RCHOP vs RCHOP + Odronextamab. He is interested in the study. Research staff is on site to help and will follow up on eligibility.   -9/5: due to existing Alzheimer's disease, he is not eligible for the above study. Will pursue the SOC, I.e. RCHOP. The pre-planned treatment is 6 cycles. Will need to complete work up.   - 9/18:  nodule noted on left wrist.  Area outlined for monitoring.  Likely due to IV placement.  Will monitor  - 9/25: nodule to left wrist nearly resolved. Overall doing well.   today.  Given filgrastim and started on levofloxacin.  Filgrastim ordered PRN ANC < 500     Plan 9/25:  -C2 RCHOP planned for 10/2/24. Count check + mal hem BEBETO qw.   -Dr. Daley follow up 9/26/24

## 2024-09-25 NOTE — PROGRESS NOTES
Patient ID: Edmund Matthews is a 64 y.o. male.  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  SUBTYPE BY BUD CRITERIA: Germinal center type (CD10-, BCL6+, MUM1-).   MYC/BCL2 EXPRESSOR PHENOTYPE: Not a double expressor (BCL2+, C-MYC-).  -Stage IV due to extranodal involvement (sinus, bone, premasillary soft tissue, orbit, nose) as shown below in the PET of 8/29:  Intensely hypermetabolic mass centered at the left maxilla causing osseous erosion and extending  into the left pre maxillary soft tissues, with involvement of the inferior margin of the left orbit and left lateral nose, as well as the left maxillary sinus with SUV max of 8.4.  -Tumor burden is relatively low, but location is high risk.  -Will need to assess orbits.   -For stage IV DLBCL, will need systemic chemo. R-CHOP is considered a standard. Furthermore, the MARTINEZ 3 is also a reasonable or preferred option, as it randomize RCHOP vs RCHOP + Odronextamab. He is interested in the study. Research staff is on site to help and will follow up on eligibility.   -9/5: due to existing Alzheimer's disease, he is not eligible for the above study. Will pursue the SOC, I.e. RCHOP. The pre-planned treatment is 6 cycles. Will need to complete work up.   - 9/18:  nodule noted on left wrist.  Area outlined for monitoring.  Likely due to IV placement.  Will monitor  - 9/25: nodule to left wrist nearly resolved. Overall doing well.   today.  Given filgrastim and started on levofloxacin.  Filgrastim ordered PRN ANC < 500     Plan 9/25:  -C2 RCHOP planned for 10/2/24. Count check  + mal hem BEBETO qw.   -Dr. Daley follow up 9/26/24    Oncology History   Diffuse large B-cell lymphoma of lymph nodes of multiple regions (Multi)   8/30/2024 Initial Diagnosis    Diffuse large B-cell lymphoma of lymph nodes of multiple regions (Multi)     9/11/2024 -  Chemotherapy    R-CHOP (Cyclophosphamide / DOXOrubicin / VinCRIStine / PredniSONE) + RiTUXimab, 21 Day Cycles        SUBJECTIVE:  Patient presents today, accompanied by his wife, for follow up.  He reports feeling well.  He continues to be active and hike around his property.  No concerns or complaints today.       Review of Systems   Constitutional:  Negative for chills, fever and unexpected weight change.   HENT:  Negative for mouth sores and nosebleeds.    Respiratory:  Negative for cough, chest tightness and shortness of breath.    Cardiovascular:  Negative for chest pain and leg swelling.   Gastrointestinal:  Negative for abdominal pain, blood in stool, constipation, diarrhea, nausea and vomiting.   Genitourinary:  Negative for dysuria and hematuria.   Skin:  Negative for rash.   Neurological:  Negative for dizziness, light-headedness, numbness and headaches.     OBJECTIVE:  KPS: Karnofsky Score: 100 - Fully active, able to carry on all pre-disease performed without restriction     Physical Exam  Constitutional:       Appearance: Normal appearance.   HENT:      Head: Normocephalic.   Eyes:      Pupils: Pupils are equal, round, and reactive to light.   Cardiovascular:      Rate and Rhythm: Normal rate and regular rhythm.   Pulmonary:      Effort: Pulmonary effort is normal.      Breath sounds: Normal breath sounds.   Abdominal:      General: Bowel sounds are normal.      Palpations: Abdomen is soft.   Musculoskeletal:         General: Normal range of motion.      Cervical back: Normal range of motion and neck supple.   Lymphadenopathy:      Comments: No lymphadenopathy   Skin:     General: Skin is warm and dry.      Findings: No lesion or rash.    Neurological:      General: No focal deficit present.      Mental Status: He is alert and oriented to person, place, and time. Mental status is at baseline.      Comments: No numbness or tingling   Psychiatric:         Mood and Affect: Mood normal.       Current Outpatient Medications   Medication Instructions    allopurinol (ZYLOPRIM) 300 mg, oral, Daily    OLANZapine (ZYPREXA) 5 mg, oral, Nightly, For 4 days starting the evening of treatment.    ondansetron (ZOFRAN) 8 mg, oral, Every 8 hours PRN    predniSONE (Deltasone) 50 mg tablet Take 100 mg by mouth on Days 1, 2, 3, 4, and 5 of treatment cycle.    prochlorperazine (COMPAZINE) 10 mg, oral, Every 6 hours PRN      Laboratory:  The pertinent laboratory results were reviewed and discussed with the patient.    Lab Results   Component Value Date    WBC 2.4 (L) 09/18/2024    HCT 36.4 (L) 09/18/2024    HGB 12.2 (L) 09/18/2024     09/18/2024    K 3.9 09/18/2024    CALCIUM 9.1 09/18/2024     09/18/2024    BILITOT 0.6 09/18/2024    ALT 13 09/18/2024    AST 13 09/18/2024    BUN 22 09/18/2024    CREATININE 1.03 09/18/2024      Note: for a comprehensive list of the patient's lab results, access the Results Review activity.    RTC:  Count check + mal hem BEBETO qw  Dr. Daley follow up 9/26/24    Ethel Plasencia, DENAE-CNP

## 2024-09-26 ENCOUNTER — OFFICE VISIT (OUTPATIENT)
Dept: HEMATOLOGY/ONCOLOGY | Facility: HOSPITAL | Age: 64
End: 2024-09-26
Payer: MEDICARE

## 2024-09-26 VITALS
TEMPERATURE: 98.1 F | SYSTOLIC BLOOD PRESSURE: 144 MMHG | BODY MASS INDEX: 23.39 KG/M2 | HEART RATE: 80 BPM | DIASTOLIC BLOOD PRESSURE: 77 MMHG | WEIGHT: 174 LBS | OXYGEN SATURATION: 98 %

## 2024-09-26 DIAGNOSIS — C83.38 DIFFUSE LARGE B-CELL LYMPHOMA OF LYMPH NODES OF MULTIPLE REGIONS (MULTI): Primary | ICD-10-CM

## 2024-09-26 PROCEDURE — 99215 OFFICE O/P EST HI 40 MIN: CPT | Performed by: INTERNAL MEDICINE

## 2024-09-26 PROCEDURE — 1126F AMNT PAIN NOTED NONE PRSNT: CPT | Performed by: INTERNAL MEDICINE

## 2024-09-26 PROCEDURE — 1157F ADVNC CARE PLAN IN RCRD: CPT | Performed by: INTERNAL MEDICINE

## 2024-09-26 RX ORDER — DIPHENHYDRAMINE HCL 50 MG
50 CAPSULE ORAL ONCE
OUTPATIENT
Start: 2024-10-23

## 2024-09-26 RX ORDER — ALBUTEROL SULFATE 0.83 MG/ML
3 SOLUTION RESPIRATORY (INHALATION) AS NEEDED
OUTPATIENT
Start: 2024-10-23

## 2024-09-26 RX ORDER — DIPHENHYDRAMINE HYDROCHLORIDE 50 MG/ML
50 INJECTION INTRAMUSCULAR; INTRAVENOUS AS NEEDED
OUTPATIENT
Start: 2024-10-23

## 2024-09-26 RX ORDER — FAMOTIDINE 10 MG/ML
20 INJECTION INTRAVENOUS ONCE AS NEEDED
OUTPATIENT
Start: 2024-10-23

## 2024-09-26 RX ORDER — PROCHLORPERAZINE MALEATE 10 MG
10 TABLET ORAL EVERY 6 HOURS PRN
OUTPATIENT
Start: 2024-10-23

## 2024-09-26 RX ORDER — EPINEPHRINE 0.3 MG/.3ML
0.3 INJECTION SUBCUTANEOUS EVERY 5 MIN PRN
OUTPATIENT
Start: 2024-10-23

## 2024-09-26 RX ORDER — PALONOSETRON 0.05 MG/ML
0.25 INJECTION, SOLUTION INTRAVENOUS ONCE
OUTPATIENT
Start: 2024-10-23

## 2024-09-26 RX ORDER — PROCHLORPERAZINE EDISYLATE 5 MG/ML
10 INJECTION INTRAMUSCULAR; INTRAVENOUS EVERY 6 HOURS PRN
OUTPATIENT
Start: 2024-10-23

## 2024-09-26 RX ORDER — DOXORUBICIN HYDROCHLORIDE 2 MG/ML
50 INJECTION, SOLUTION INTRAVENOUS ONCE
OUTPATIENT
Start: 2024-10-23

## 2024-09-26 RX ORDER — ACETAMINOPHEN 325 MG/1
650 TABLET ORAL ONCE
OUTPATIENT
Start: 2024-10-23

## 2024-09-26 ASSESSMENT — ENCOUNTER SYMPTOMS
EYES NEGATIVE: 1
PSYCHIATRIC NEGATIVE: 1
ENDOCRINE NEGATIVE: 1
RESPIRATORY NEGATIVE: 1
CARDIOVASCULAR NEGATIVE: 1
CONSTITUTIONAL NEGATIVE: 1
HEMATOLOGIC/LYMPHATIC NEGATIVE: 1
MUSCULOSKELETAL NEGATIVE: 1
NEUROLOGICAL NEGATIVE: 1
GASTROINTESTINAL NEGATIVE: 1

## 2024-09-26 ASSESSMENT — PAIN SCALES - GENERAL: PAINLEVEL: 0-NO PAIN

## 2024-09-26 NOTE — PROGRESS NOTES
Patient ID: Edmund Matthews is a 64 y.o. male.  Referring Physician: Heber Daley MD PhD  [id number] [id number]  Primary Care Provider: Mp Bee MD      Subjective    The patient has been in excellent health, but was recently diagnosed with early onset Alzheimer's. He is not on treatments, but chose to retire early. On 7/3/2024 he noted swelling on the left side of the nose and cheek bone. No pain. This was quickly worked up, including a visit with Dr. Mitchell of [facility], and biopsy on 8/9/2024, showing   A: MAXILLA, BIOPSY:  --  LIMITED SAMPLE WITH EXTENSIVE CRUSH ARTIFACT; FINDINGS CONCERNING FOR LARGE B-CELL LYMPHOMA, FINAL CLASSIFICATION PENDING GENETIC STUDIES.  SUBTYPE BY BUD CRITERIA: Germinal center type (CD10-, BCL6+, MUM1-).   MYC/BCL2 EXPRESSOR PHENOTYPE: Not a double expressor (BCL2+, C-MYC-).    9/26/2024: he tolerated first dose of RCHOP on 9/11. Today he feels well, but does have mild dry cough. Feels mild tingling sensation in the left face. No fever wt loss or night sweats. No pain or headache. No change in vision or swallowing. Memory deficit is mild and stable.          Review of Systems   Constitutional: Negative.    HENT:   Positive for lump/mass.    Eyes: Negative.    Respiratory: Negative.     Cardiovascular: Negative.    Gastrointestinal: Negative.    Endocrine: Negative.    Genitourinary: Negative.     Musculoskeletal: Negative.    Skin: Negative.    Neurological: Negative.    Hematological: Negative.    Psychiatric/Behavioral: Negative.          Objective   BSA: 2.01 meters squared  /77 (BP Location: Left arm, Patient Position: Sitting, BP Cuff Size: Adult)   Pulse 80   Temp 36.7 °C (98.1 °F) (Temporal)   Wt 78.9 kg (174 lb)   SpO2 98%   BMI 23.39 kg/m²     No family history on file.  Oncology History   Diffuse large B-cell lymphoma of lymph nodes of multiple regions (Multi)   8/30/2024 Initial Diagnosis    Diffuse large B-cell lymphoma of lymph nodes of  multiple regions (Multi)     9/11/2024 -  Chemotherapy    R-CHOP (Cyclophosphamide / DOXOrubicin / VinCRIStine / PredniSONE) + RiTUXimab, 21 Day Cycles         Edmund Matthews  reports that he has never smoked. He has never used smokeless tobacco.  He  has no history on file for alcohol use.  He  has no history on file for drug use.    Physical Exam  Constitutional:       General: He is not in acute distress.     Appearance: He is not toxic-appearing.   HENT:      Head: Normocephalic.      Nose: Nose normal.      Comments: A mass without clear border is appreciated between the left side of nose and the cheekbone. Non tender.   -9/26/2024: the mass is less prominent. However, it is not measurable.      Mouth/Throat:      Mouth: Mucous membranes are moist.   Eyes:      Extraocular Movements: Extraocular movements intact.      Pupils: Pupils are equal, round, and reactive to light.   Cardiovascular:      Rate and Rhythm: Normal rate and regular rhythm.      Heart sounds: No murmur heard.  Pulmonary:      Effort: Pulmonary effort is normal.      Breath sounds: Normal breath sounds.   Abdominal:      General: Bowel sounds are normal.      Palpations: Abdomen is soft. There is no mass.      Tenderness: There is no abdominal tenderness. There is no rebound.   Musculoskeletal:         General: No swelling, tenderness, deformity or signs of injury.      Right lower leg: No edema.      Left lower leg: No edema.   Skin:     Coloration: Skin is not jaundiced.      Findings: No bruising, lesion or rash.   Neurological:      Mental Status: He is alert and oriented to person, place, and time.      Cranial Nerves: No cranial nerve deficit.      Motor: No weakness.      Gait: Gait normal.   Psychiatric:         Mood and Affect: Mood normal.         Performance Status:  Asymptomatic    Assessment/Plan      A/P  ##DLBCL, newly dx, 8/9/2024:  - A: MAXILLA, BIOPSY:  --  LIMITED SAMPLE WITH EXTENSIVE CRUSH ARTIFACT; FINDINGS CONCERNING FOR  LARGE B-CELL LYMPHOMA, FINAL CLASSIFICATION PENDING GENETIC STUDIES.  SUBTYPE BY BUD CRITERIA: Germinal center type (CD10-, BCL6+, MUM1-).   MYC/BCL2 EXPRESSOR PHENOTYPE: Not a double expressor (BCL2+, C-MYC-).  -Stage IV due to extranodal involvement (sinus, bone, premasillary soft tissue, orbit, nose) as shown below in the PET of 8/29:  Intensely hypermetabolic mass centered at the left maxilla causing osseous erosion and extending  into the left pre maxillary soft tissues, with involvement of the inferior margin of the left orbit and left lateral nose, as well as the left maxillary sinus with SUV max of 8.4.  -Tumor burden is relatively low, but location is high risk.  -Will need to assess orbits.   -For stage IV DLBCL, will need systemic chemo. R-CHOP is considered a standard. Furthermore, the MARTINEZ 3 is also a reasonable or preferred option, as it randomize RCHOP vs RCHOP + Odronextamab. He is interested in the study. Research staff is on site to help and will follow up on eligibility.   -9/5: due to existing Alzheimer's disease, he is not eligible for the above study. Will pursue the SOC, I.e. RCHOP. The pre-planned treatment is 6 cycles. Will need to complete work up.   -9/26: MRI of the orbits show disease limited to the facial structure but does not show orbital or optic nerve involvement. ECHO was WNL. Will therefore continue RCHOP, c2 on 10/2, then 10/23.     Plan 9/5:  -RCHOP, c2 on 10/2, then 10/23.   -BEBETO 3W.   -RTC 6w with MD.       Time spent: 45 min.                Heber Daley MD PhD

## 2024-10-02 ENCOUNTER — INFUSION (OUTPATIENT)
Dept: HEMATOLOGY/ONCOLOGY | Facility: HOSPITAL | Age: 64
End: 2024-10-02
Payer: MEDICARE

## 2024-10-02 VITALS
HEART RATE: 72 BPM | SYSTOLIC BLOOD PRESSURE: 142 MMHG | BODY MASS INDEX: 22.85 KG/M2 | TEMPERATURE: 97 F | RESPIRATION RATE: 18 BRPM | OXYGEN SATURATION: 97 % | DIASTOLIC BLOOD PRESSURE: 74 MMHG | WEIGHT: 170 LBS

## 2024-10-02 DIAGNOSIS — C83.38 DIFFUSE LARGE B-CELL LYMPHOMA OF LYMPH NODES OF MULTIPLE REGIONS (MULTI): ICD-10-CM

## 2024-10-02 LAB
ALBUMIN SERPL BCP-MCNC: 3.9 G/DL (ref 3.4–5)
ALP SERPL-CCNC: 69 U/L (ref 33–136)
ALT SERPL W P-5'-P-CCNC: 12 U/L (ref 10–52)
ANION GAP SERPL CALC-SCNC: 14 MMOL/L (ref 10–20)
AST SERPL W P-5'-P-CCNC: 17 U/L (ref 9–39)
BASOPHILS # BLD MANUAL: 0 X10*3/UL (ref 0–0.1)
BASOPHILS NFR BLD MANUAL: 0 %
BILIRUB SERPL-MCNC: 0.5 MG/DL (ref 0–1.2)
BUN SERPL-MCNC: 17 MG/DL (ref 6–23)
BURR CELLS BLD QL SMEAR: ABNORMAL
CALCIUM SERPL-MCNC: 9.4 MG/DL (ref 8.6–10.3)
CHLORIDE SERPL-SCNC: 104 MMOL/L (ref 98–107)
CO2 SERPL-SCNC: 26 MMOL/L (ref 21–32)
CREAT SERPL-MCNC: 1.19 MG/DL (ref 0.5–1.3)
EGFRCR SERPLBLD CKD-EPI 2021: 67 ML/MIN/1.73M*2
EOSINOPHIL # BLD MANUAL: 0 X10*3/UL (ref 0–0.7)
EOSINOPHIL NFR BLD MANUAL: 0 %
ERYTHROCYTE [DISTWIDTH] IN BLOOD BY AUTOMATED COUNT: 13.8 % (ref 11.5–14.5)
GLUCOSE SERPL-MCNC: 86 MG/DL (ref 74–99)
HCT VFR BLD AUTO: 39.2 % (ref 41–52)
HGB BLD-MCNC: 12.6 G/DL (ref 13.5–17.5)
IMM GRANULOCYTES # BLD AUTO: 0.64 X10*3/UL (ref 0–0.7)
IMM GRANULOCYTES NFR BLD AUTO: 12 % (ref 0–0.9)
LYMPHOCYTES # BLD MANUAL: 0.69 X10*3/UL (ref 1.2–4.8)
LYMPHOCYTES NFR BLD MANUAL: 13 %
MCH RBC QN AUTO: 29.9 PG (ref 26–34)
MCHC RBC AUTO-ENTMCNC: 32.1 G/DL (ref 32–36)
MCV RBC AUTO: 93 FL (ref 80–100)
METAMYELOCYTES # BLD MANUAL: 0.32 X10*3/UL
METAMYELOCYTES NFR BLD MANUAL: 6 %
MONOCYTES # BLD MANUAL: 0.53 X10*3/UL (ref 0.1–1)
MONOCYTES NFR BLD MANUAL: 10 %
MYELOCYTES # BLD MANUAL: 0.27 X10*3/UL
MYELOCYTES NFR BLD MANUAL: 5 %
NEUTS SEG # BLD MANUAL: 3.5 X10*3/UL (ref 1.2–7)
NEUTS SEG NFR BLD MANUAL: 66 %
NRBC BLD-RTO: 0 /100 WBCS (ref 0–0)
OVALOCYTES BLD QL SMEAR: ABNORMAL
PLATELET # BLD AUTO: 436 X10*3/UL (ref 150–450)
POLYCHROMASIA BLD QL SMEAR: ABNORMAL
POTASSIUM SERPL-SCNC: 4.1 MMOL/L (ref 3.5–5.3)
PROT SERPL-MCNC: 6.6 G/DL (ref 6.4–8.2)
RBC # BLD AUTO: 4.21 X10*6/UL (ref 4.5–5.9)
RBC MORPH BLD: ABNORMAL
SCHISTOCYTES BLD QL SMEAR: ABNORMAL
SODIUM SERPL-SCNC: 140 MMOL/L (ref 136–145)
TOTAL CELLS COUNTED BLD: 100
WBC # BLD AUTO: 5.3 X10*3/UL (ref 4.4–11.3)

## 2024-10-02 PROCEDURE — 96417 CHEMO IV INFUS EACH ADDL SEQ: CPT

## 2024-10-02 PROCEDURE — 96413 CHEMO IV INFUSION 1 HR: CPT

## 2024-10-02 PROCEDURE — 96377 APPLICATON ON-BODY INJECTOR: CPT

## 2024-10-02 PROCEDURE — 96367 TX/PROPH/DG ADDL SEQ IV INF: CPT

## 2024-10-02 PROCEDURE — 80053 COMPREHEN METABOLIC PANEL: CPT

## 2024-10-02 PROCEDURE — 96375 TX/PRO/DX INJ NEW DRUG ADDON: CPT | Mod: INF

## 2024-10-02 PROCEDURE — 2500000001 HC RX 250 WO HCPCS SELF ADMINISTERED DRUGS (ALT 637 FOR MEDICARE OP): Performed by: INTERNAL MEDICINE

## 2024-10-02 PROCEDURE — 96415 CHEMO IV INFUSION ADDL HR: CPT

## 2024-10-02 PROCEDURE — 2500000004 HC RX 250 GENERAL PHARMACY W/ HCPCS (ALT 636 FOR OP/ED): Mod: JZ | Performed by: INTERNAL MEDICINE

## 2024-10-02 PROCEDURE — 85007 BL SMEAR W/DIFF WBC COUNT: CPT

## 2024-10-02 PROCEDURE — 85027 COMPLETE CBC AUTOMATED: CPT

## 2024-10-02 PROCEDURE — 96411 CHEMO IV PUSH ADDL DRUG: CPT

## 2024-10-02 PROCEDURE — 2500000004 HC RX 250 GENERAL PHARMACY W/ HCPCS (ALT 636 FOR OP/ED): Performed by: INTERNAL MEDICINE

## 2024-10-02 RX ORDER — EPINEPHRINE 0.3 MG/.3ML
0.3 INJECTION SUBCUTANEOUS EVERY 5 MIN PRN
Status: DISCONTINUED | OUTPATIENT
Start: 2024-10-02 | End: 2024-10-02 | Stop reason: HOSPADM

## 2024-10-02 RX ORDER — FAMOTIDINE 10 MG/ML
20 INJECTION INTRAVENOUS ONCE AS NEEDED
Status: DISCONTINUED | OUTPATIENT
Start: 2024-10-02 | End: 2024-10-02 | Stop reason: HOSPADM

## 2024-10-02 RX ORDER — DIPHENHYDRAMINE HCL 50 MG
50 CAPSULE ORAL ONCE
Status: COMPLETED | OUTPATIENT
Start: 2024-10-02 | End: 2024-10-02

## 2024-10-02 RX ORDER — PALONOSETRON 0.05 MG/ML
0.25 INJECTION, SOLUTION INTRAVENOUS ONCE
Status: COMPLETED | OUTPATIENT
Start: 2024-10-02 | End: 2024-10-02

## 2024-10-02 RX ORDER — DIPHENHYDRAMINE HYDROCHLORIDE 50 MG/ML
50 INJECTION INTRAMUSCULAR; INTRAVENOUS AS NEEDED
Status: DISCONTINUED | OUTPATIENT
Start: 2024-10-02 | End: 2024-10-02 | Stop reason: HOSPADM

## 2024-10-02 RX ORDER — DOXORUBICIN HYDROCHLORIDE 2 MG/ML
50 INJECTION, SOLUTION INTRAVENOUS ONCE
Status: COMPLETED | OUTPATIENT
Start: 2024-10-02 | End: 2024-10-02

## 2024-10-02 RX ORDER — PROCHLORPERAZINE EDISYLATE 5 MG/ML
10 INJECTION INTRAMUSCULAR; INTRAVENOUS EVERY 6 HOURS PRN
Status: DISCONTINUED | OUTPATIENT
Start: 2024-10-02 | End: 2024-10-02 | Stop reason: HOSPADM

## 2024-10-02 RX ORDER — PROCHLORPERAZINE MALEATE 10 MG
10 TABLET ORAL EVERY 6 HOURS PRN
Status: DISCONTINUED | OUTPATIENT
Start: 2024-10-02 | End: 2024-10-02 | Stop reason: HOSPADM

## 2024-10-02 RX ORDER — ACETAMINOPHEN 325 MG/1
650 TABLET ORAL ONCE
Status: COMPLETED | OUTPATIENT
Start: 2024-10-02 | End: 2024-10-02

## 2024-10-02 RX ORDER — ALBUTEROL SULFATE 0.83 MG/ML
3 SOLUTION RESPIRATORY (INHALATION) AS NEEDED
Status: DISCONTINUED | OUTPATIENT
Start: 2024-10-02 | End: 2024-10-02 | Stop reason: HOSPADM

## 2024-10-02 NOTE — PROGRESS NOTES
Rituximab dose rates verified by Lan RN    Rates - VTBI:  23 ml/hr - 11.5 ml  46 ml/hr - 23 ml  69 ml/hr - 34.5 ml  92 ml/hr - 46 ml  115 ml/hr - 57.5 ml  138 ml/hr - 69 ml  161 ml/hr - 80.5 ml  184 ml/hr - 20 ml

## 2024-10-02 NOTE — PROGRESS NOTES
[facility] Infusion Nursing Note  10/02/24    Edmund Matthews is a 64 y.o. year old male patient presenting to outpatient infusion for cycle 2 day 1 of the following regimen:    Treatment Plans       Name Type Plan Dates Plan Provider         Active    R-CHOP (Cyclophosphamide / DOXOrubicin / VinCRIStine / PredniSONE) + RiTUXimab, 21 Day Cycles Oncology Treatment  9/10/2024 - Present Heber Daley MD PhD                  Since the last visit, he reports doing well. Overall, he states that energy level is good. Appetite has been unchanged and good. he reports no complaints.     Line type: PIV  Line removed/maintained prior to discharge: removed    Administrations This Visit       acetaminophen (Tylenol) tablet 650 mg       Admin Date  10/02/2024 Action  Given Dose  650 mg Route  oral Documented By  Sabiha Schwarz RN              cycloPHOSphamide (Cytoxan) 1,500 mg in sodium chloride 0.9% 342 mL IV       Admin Date  10/02/2024 Action  New Bag Dose  1,500 mg Rate  684 mL/hr Route  intravenous Documented By  Allyson Wooten RN              diphenhydrAMINE (BENADryl) capsule 50 mg       Admin Date  10/02/2024 Action  Given Dose  50 mg Route  oral Documented By  Sabiha Schwarz RN              DOXOrubicin (Adriamycin) IV syringe 50 mg/m2 = 100 mg 50 mL       Admin Date  10/02/2024 Action  Given Dose  100 mg Route  intravenous Documented By  Allyson Wooten RN              fosaprepitant 150 mg in sodium chloride 0.9% 250 mL IV       Admin Date  10/02/2024 Action  New Bag Dose  147.0588 mg Route  intravenous Documented By  Allyson Wooten RN              palonosetron (Aloxi) injection 250 mcg       Admin Date  10/02/2024 Action  Given Dose  250 mcg Route  intravenous Documented By  Allyson Wooten RN              pegfilgrastim (Neulasta Onpro) injection 6 mg       Admin Date  10/02/2024 Action  Given Dose  6 mg Route  subcutaneous Documented By  Allyson Wooten RN              riTUXimab-pvvr (Ruxience) 746 mg in sodium chloride 0.9%  341.6 mL IV       Admin Date  10/02/2024 Action  New Bag Dose  746 mg Rate  23 mL/hr Route  intravenous Documented By  Missael Wooten RN               Admin Date  10/02/2024 Action  Rate/Dose Change Dose   Rate  46 mL/hr Route  intravenous Documented By  Sabiha Schwarz RN               Admin Date  10/02/2024 Action  Rate/Dose Change Dose   Rate  69 mL/hr Route  intravenous Documented By  Sabiha Schwarz RN               Admin Date  10/02/2024 Action  Rate/Dose Change Dose   Rate  92 mL/hr Route  intravenous Documented By  Sabiha Schwarz RN               Admin Date  10/02/2024 Action  Rate/Dose Change Dose   Rate  115 mL/hr Route  intravenous Documented By  Missael Wooten RN               Admin Date  10/02/2024 Action  Rate/Dose Change Dose   Rate  138 mL/hr Route  intravenous Documented By  Missael Wooten RN               Admin Date  10/02/2024 Action  Rate/Dose Change Dose   Rate  161 mL/hr Route  intravenous Documented By  Missael Wooten RN              vinCRIStine (Oncovin) 2 mg in sodium chloride 0.9% 60 mL IV       Admin Date  10/02/2024 Action  New Bag Dose  2 mg Rate  360 mL/hr Route  intravenous Documented By  Missael Wooten RN                  Hypersensitivity reaction noted: No  Patient tolerated treatment well. Discharged home in stable condition.    Follow-up Plan: 10/23    MISSAEL WOOTEN RN

## 2024-10-23 ENCOUNTER — INFUSION (OUTPATIENT)
Dept: HEMATOLOGY/ONCOLOGY | Facility: HOSPITAL | Age: 64
End: 2024-10-23
Payer: MEDICARE

## 2024-10-23 ENCOUNTER — OFFICE VISIT (OUTPATIENT)
Dept: HEMATOLOGY/ONCOLOGY | Facility: HOSPITAL | Age: 64
End: 2024-10-23
Payer: MEDICARE

## 2024-10-23 VITALS
DIASTOLIC BLOOD PRESSURE: 50 MMHG | WEIGHT: 171.08 LBS | BODY MASS INDEX: 23 KG/M2 | HEART RATE: 56 BPM | RESPIRATION RATE: 18 BRPM | OXYGEN SATURATION: 96 % | SYSTOLIC BLOOD PRESSURE: 93 MMHG | TEMPERATURE: 98.6 F

## 2024-10-23 DIAGNOSIS — C83.38 DIFFUSE LARGE B-CELL LYMPHOMA OF LYMPH NODES OF MULTIPLE REGIONS (MULTI): Primary | ICD-10-CM

## 2024-10-23 DIAGNOSIS — C83.38 DIFFUSE LARGE B-CELL LYMPHOMA OF LYMPH NODES OF MULTIPLE REGIONS (MULTI): ICD-10-CM

## 2024-10-23 LAB
ALBUMIN SERPL BCP-MCNC: 3.7 G/DL (ref 3.4–5)
ALP SERPL-CCNC: 72 U/L (ref 33–136)
ALT SERPL W P-5'-P-CCNC: 10 U/L (ref 10–52)
ANION GAP SERPL CALC-SCNC: 12 MMOL/L (ref 10–20)
AST SERPL W P-5'-P-CCNC: 14 U/L (ref 9–39)
BASOPHILS # BLD AUTO: 0.1 X10*3/UL (ref 0–0.1)
BASOPHILS NFR BLD AUTO: 2.2 %
BILIRUB SERPL-MCNC: 0.6 MG/DL (ref 0–1.2)
BUN SERPL-MCNC: 18 MG/DL (ref 6–23)
CALCIUM SERPL-MCNC: 9 MG/DL (ref 8.6–10.3)
CHLORIDE SERPL-SCNC: 106 MMOL/L (ref 98–107)
CO2 SERPL-SCNC: 27 MMOL/L (ref 21–32)
CREAT SERPL-MCNC: 1.11 MG/DL (ref 0.5–1.3)
EGFRCR SERPLBLD CKD-EPI 2021: 73 ML/MIN/1.73M*2
EOSINOPHIL # BLD AUTO: 0 X10*3/UL (ref 0–0.7)
EOSINOPHIL NFR BLD AUTO: 0 %
ERYTHROCYTE [DISTWIDTH] IN BLOOD BY AUTOMATED COUNT: 15.6 % (ref 11.5–14.5)
GLUCOSE SERPL-MCNC: 85 MG/DL (ref 74–99)
HCT VFR BLD AUTO: 34.6 % (ref 41–52)
HGB BLD-MCNC: 11 G/DL (ref 13.5–17.5)
IMM GRANULOCYTES # BLD AUTO: 0.05 X10*3/UL (ref 0–0.7)
IMM GRANULOCYTES NFR BLD AUTO: 1.1 % (ref 0–0.9)
LDH SERPL L TO P-CCNC: 169 U/L (ref 84–246)
LYMPHOCYTES # BLD AUTO: 0.41 X10*3/UL (ref 1.2–4.8)
LYMPHOCYTES NFR BLD AUTO: 9.2 %
MCH RBC QN AUTO: 29.8 PG (ref 26–34)
MCHC RBC AUTO-ENTMCNC: 31.8 G/DL (ref 32–36)
MCV RBC AUTO: 94 FL (ref 80–100)
MONOCYTES # BLD AUTO: 0.46 X10*3/UL (ref 0.1–1)
MONOCYTES NFR BLD AUTO: 10.3 %
NEUTROPHILS # BLD AUTO: 3.45 X10*3/UL (ref 1.2–7.7)
NEUTROPHILS NFR BLD AUTO: 77.2 %
NRBC BLD-RTO: 0 /100 WBCS (ref 0–0)
PLATELET # BLD AUTO: 385 X10*3/UL (ref 150–450)
POTASSIUM SERPL-SCNC: 4.3 MMOL/L (ref 3.5–5.3)
PROT SERPL-MCNC: 6.3 G/DL (ref 6.4–8.2)
RBC # BLD AUTO: 3.69 X10*6/UL (ref 4.5–5.9)
SODIUM SERPL-SCNC: 141 MMOL/L (ref 136–145)
WBC # BLD AUTO: 4.5 X10*3/UL (ref 4.4–11.3)

## 2024-10-23 PROCEDURE — 85025 COMPLETE CBC W/AUTO DIFF WBC: CPT

## 2024-10-23 PROCEDURE — 99215 OFFICE O/P EST HI 40 MIN: CPT | Performed by: NURSE PRACTITIONER

## 2024-10-23 PROCEDURE — 84075 ASSAY ALKALINE PHOSPHATASE: CPT

## 2024-10-23 PROCEDURE — 96413 CHEMO IV INFUSION 1 HR: CPT

## 2024-10-23 PROCEDURE — 2500000001 HC RX 250 WO HCPCS SELF ADMINISTERED DRUGS (ALT 637 FOR MEDICARE OP): Performed by: INTERNAL MEDICINE

## 2024-10-23 PROCEDURE — 2500000004 HC RX 250 GENERAL PHARMACY W/ HCPCS (ALT 636 FOR OP/ED): Performed by: INTERNAL MEDICINE

## 2024-10-23 PROCEDURE — 96367 TX/PROPH/DG ADDL SEQ IV INF: CPT

## 2024-10-23 PROCEDURE — 96411 CHEMO IV PUSH ADDL DRUG: CPT

## 2024-10-23 PROCEDURE — 96417 CHEMO IV INFUS EACH ADDL SEQ: CPT

## 2024-10-23 PROCEDURE — 96415 CHEMO IV INFUSION ADDL HR: CPT

## 2024-10-23 PROCEDURE — 83615 LACTATE (LD) (LDH) ENZYME: CPT

## 2024-10-23 PROCEDURE — 1036F TOBACCO NON-USER: CPT | Performed by: NURSE PRACTITIONER

## 2024-10-23 PROCEDURE — 1160F RVW MEDS BY RX/DR IN RCRD: CPT | Performed by: NURSE PRACTITIONER

## 2024-10-23 PROCEDURE — 1159F MED LIST DOCD IN RCRD: CPT | Performed by: NURSE PRACTITIONER

## 2024-10-23 PROCEDURE — 96375 TX/PRO/DX INJ NEW DRUG ADDON: CPT | Mod: INF

## 2024-10-23 PROCEDURE — 1157F ADVNC CARE PLAN IN RCRD: CPT | Performed by: NURSE PRACTITIONER

## 2024-10-23 PROCEDURE — 96377 APPLICATON ON-BODY INJECTOR: CPT

## 2024-10-23 RX ORDER — PROCHLORPERAZINE EDISYLATE 5 MG/ML
10 INJECTION INTRAMUSCULAR; INTRAVENOUS EVERY 6 HOURS PRN
Status: DISCONTINUED | OUTPATIENT
Start: 2024-10-23 | End: 2024-10-23 | Stop reason: HOSPADM

## 2024-10-23 RX ORDER — HEPARIN 100 UNIT/ML
500 SYRINGE INTRAVENOUS AS NEEDED
OUTPATIENT
Start: 2024-10-23

## 2024-10-23 RX ORDER — DOXORUBICIN HYDROCHLORIDE 2 MG/ML
50 INJECTION, SOLUTION INTRAVENOUS ONCE
Status: COMPLETED | OUTPATIENT
Start: 2024-10-23 | End: 2024-10-23

## 2024-10-23 RX ORDER — DIPHENHYDRAMINE HCL 50 MG
50 CAPSULE ORAL ONCE
Status: COMPLETED | OUTPATIENT
Start: 2024-10-23 | End: 2024-10-23

## 2024-10-23 RX ORDER — ALBUTEROL SULFATE 0.83 MG/ML
3 SOLUTION RESPIRATORY (INHALATION) AS NEEDED
Status: DISCONTINUED | OUTPATIENT
Start: 2024-10-23 | End: 2024-10-23 | Stop reason: HOSPADM

## 2024-10-23 RX ORDER — DIPHENHYDRAMINE HYDROCHLORIDE 50 MG/ML
50 INJECTION INTRAMUSCULAR; INTRAVENOUS AS NEEDED
Status: DISCONTINUED | OUTPATIENT
Start: 2024-10-23 | End: 2024-10-23 | Stop reason: HOSPADM

## 2024-10-23 RX ORDER — PROCHLORPERAZINE MALEATE 10 MG
10 TABLET ORAL EVERY 6 HOURS PRN
Status: DISCONTINUED | OUTPATIENT
Start: 2024-10-23 | End: 2024-10-23 | Stop reason: HOSPADM

## 2024-10-23 RX ORDER — PALONOSETRON 0.05 MG/ML
0.25 INJECTION, SOLUTION INTRAVENOUS ONCE
Status: COMPLETED | OUTPATIENT
Start: 2024-10-23 | End: 2024-10-23

## 2024-10-23 RX ORDER — ACETAMINOPHEN 325 MG/1
650 TABLET ORAL ONCE
Status: COMPLETED | OUTPATIENT
Start: 2024-10-23 | End: 2024-10-23

## 2024-10-23 RX ORDER — EPINEPHRINE 0.3 MG/.3ML
0.3 INJECTION SUBCUTANEOUS EVERY 5 MIN PRN
Status: DISCONTINUED | OUTPATIENT
Start: 2024-10-23 | End: 2024-10-23 | Stop reason: HOSPADM

## 2024-10-23 RX ORDER — FAMOTIDINE 10 MG/ML
20 INJECTION INTRAVENOUS ONCE AS NEEDED
Status: DISCONTINUED | OUTPATIENT
Start: 2024-10-23 | End: 2024-10-23 | Stop reason: HOSPADM

## 2024-10-23 RX ORDER — HEPARIN SODIUM,PORCINE/PF 10 UNIT/ML
50 SYRINGE (ML) INTRAVENOUS AS NEEDED
OUTPATIENT
Start: 2024-10-23

## 2024-10-23 ASSESSMENT — ENCOUNTER SYMPTOMS
NAUSEA: 0
DIARRHEA: 0
DIZZINESS: 0
DYSURIA: 0
HEADACHES: 0
CHEST TIGHTNESS: 0
VOMITING: 0
UNEXPECTED WEIGHT CHANGE: 0
CHILLS: 0
SHORTNESS OF BREATH: 0
CONSTIPATION: 0
ABDOMINAL PAIN: 0
LIGHT-HEADEDNESS: 0
BLOOD IN STOOL: 0
COUGH: 0
FEVER: 0
HEMATURIA: 0
NUMBNESS: 0

## 2024-10-23 ASSESSMENT — PAIN SCALES - GENERAL: PAINLEVEL_OUTOF10: 0-NO PAIN

## 2024-10-23 NOTE — PROGRESS NOTES
Patient presents to infusion appointment in stable condition for C3D1 R-CHOP. VICTORIANO Plasencia at bedside to see patient. Patient hypotensive 30 minutes into rituximab infusion, asymptomatic, VICTORIANO Plasencia CNP notified, orders to proceed with infusion. Patient remained asymptomatic for remainder of treatment, no issues throughout treatment course prior. Lab results and schedule reviewed. Discharged in stable condition with Neulasta Onpro applied, home information reviewed.

## 2024-10-23 NOTE — PROGRESS NOTES
Rituximab rates 746mg/341.6 ml    46 ml/hr x 30 min  92 ml/hr x 30 min  137 ml/hr x 30 min  183 ml/hr to finish    Rates verified with MURTAZA Parry RN

## 2024-10-23 NOTE — ASSESSMENT & PLAN NOTE
##DLBCL, newly dx, 8/9/2024:  - A: MAXILLA, BIOPSY:  --  LIMITED SAMPLE WITH EXTENSIVE CRUSH ARTIFACT; FINDINGS CONCERNING FOR LARGE B-CELL LYMPHOMA, FINAL CLASSIFICATION PENDING GENETIC STUDIES.  SUBTYPE BY BUD CRITERIA: Germinal center type (CD10-, BCL6+, MUM1-).   MYC/BCL2 EXPRESSOR PHENOTYPE: Not a double expressor (BCL2+, C-MYC-).  -Stage IV due to extranodal involvement (sinus, bone, premasillary soft tissue, orbit, nose) as shown below in the PET of 8/29:  Intensely hypermetabolic mass centered at the left maxilla causing osseous erosion and extending  into the left pre maxillary soft tissues, with involvement of the inferior margin of the left orbit and left lateral nose, as well as the left maxillary sinus with SUV max of 8.4.  -Tumor burden is relatively low, but location is high risk.  -Will need to assess orbits.   -For stage IV DLBCL, will need systemic chemo. R-CHOP is considered a standard. Furthermore, the MARTINEZ 3 is also a reasonable or preferred option, as it randomize RCHOP vs RCHOP + Odronextamab. He is interested in the study. Research staff is on site to help and will follow up on eligibility.   -9/5: due to existing Alzheimer's disease, he is not eligible for the above study. Will pursue the SOC, I.e. RCHOP. The pre-planned treatment is 6 cycles. Will need to complete work up.   -9/26: MRI of the orbits show disease limited to the facial structure but does not show orbital or optic nerve involvement. ECHO was WNL. Will therefore continue RCHOP, c2 on 10/2, then 10/23.   10/23/24:  Sharri Tx well, remains active.  Proceed with C3 R-CHOP     Plan 10/23:  -RCHOP, C4 scheduled for 11/13.   -BEBETO 3W.   -RTC 6w with MD.

## 2024-10-23 NOTE — PROGRESS NOTES
Patient ID: Edmund Matthews is a 64 y.o. male.  Referring Physician: Heber Daley MD PhD  [address] [place] [address]  Primary Care Provider: Mp Bee MD    Date of Service:  10/23/2024  Assessment & Plan  Diffuse large B-cell lymphoma of lymph nodes of multiple regions (Multi)  ##DLBCL, newly dx, 8/9/2024:  - A: MAXILLA, BIOPSY:  --  LIMITED SAMPLE WITH EXTENSIVE CRUSH ARTIFACT; FINDINGS CONCERNING FOR LARGE B-CELL LYMPHOMA, FINAL CLASSIFICATION PENDING GENETIC STUDIES.  SUBTYPE BY BUD CRITERIA: Germinal center type (CD10-, BCL6+, MUM1-).   MYC/BCL2 EXPRESSOR PHENOTYPE: Not a double expressor (BCL2+, C-MYC-).  -Stage IV due to extranodal involvement (sinus, bone, premasillary soft tissue, orbit, nose) as shown below in the PET of 8/29:  Intensely hypermetabolic mass centered at the left maxilla causing osseous erosion and extending  into the left pre maxillary soft tissues, with involvement of the inferior margin of the left orbit and left lateral nose, as well as the left maxillary sinus with SUV max of 8.4.  -Tumor burden is relatively low, but location is high risk.  -Will need to assess orbits.   -For stage IV DLBCL, will need systemic chemo. R-CHOP is considered a standard. Furthermore, the MARTINEZ 3 is also a reasonable or preferred option, as it randomize RCHOP vs RCHOP + Odronextamab. He is interested in the study. Research staff is on site to help and will follow up on eligibility.   -9/5: due to existing Alzheimer's disease, he is not eligible for the above study. Will pursue the SOC, I.e. RCHOP. The pre-planned treatment is 6 cycles. Will need to complete work up.   -9/26: MRI of the orbits show disease limited to the facial structure but does not show orbital or optic nerve involvement. ECHO was WNL. Will therefore continue RCHOP, c2 on 10/2, then 10/23.   10/23/24:  Sharri Tx well, remains active.  Proceed with C3 R-CHOP     Plan 10/23:  -RCHOP, C4 scheduled for 11/13.   -BEBETO 3W.    -RTC 6w with MD.     Oncology History   Diffuse large B-cell lymphoma of lymph nodes of multiple regions (Multi)   8/30/2024 Initial Diagnosis    Diffuse large B-cell lymphoma of lymph nodes of multiple regions (Multi)     9/11/2024 -  Chemotherapy    R-CHOP (Cyclophosphamide / DOXOrubicin / VinCRIStine / PredniSONE) + RiTUXimab, 21 Day Cycles        SUBJECTIVE:  Patient presents today, accompanied by his wife and daughter, for follow up.  Reports feeling well.  He is active and doing yard work.  Eating and drinking well without N/V/D.  No concerns or complaints today.      Review of Systems   Constitutional:  Negative for chills, fever and unexpected weight change.   HENT:  Negative for mouth sores and nosebleeds.    Respiratory:  Negative for cough, chest tightness and shortness of breath.    Cardiovascular:  Negative for chest pain and leg swelling.   Gastrointestinal:  Negative for abdominal pain, blood in stool, constipation, diarrhea, nausea and vomiting.   Genitourinary:  Negative for dysuria and hematuria.   Skin:  Negative for rash.   Neurological:  Negative for dizziness, light-headedness, numbness and headaches.     OBJECTIVE:  KPS: Karnofsky Score: 90 - Able to carry on normal activity; minor signs or symptoms of disease      Physical Exam  Constitutional:       Appearance: Normal appearance.   HENT:      Head: Normocephalic.   Eyes:      Pupils: Pupils are equal, round, and reactive to light.   Cardiovascular:      Rate and Rhythm: Normal rate and regular rhythm.   Pulmonary:      Effort: Pulmonary effort is normal.      Breath sounds: Normal breath sounds.   Abdominal:      General: Bowel sounds are normal.      Palpations: Abdomen is soft.   Musculoskeletal:         General: Normal range of motion.      Cervical back: Normal range of motion and neck supple.   Lymphadenopathy:      Comments: No lymphadenopathy   Skin:     General: Skin is warm and dry.      Findings: No lesion or rash.    Neurological:      General: No focal deficit present.      Mental Status: He is alert and oriented to person, place, and time. Mental status is at baseline.      Comments: No numbness or tingling   Psychiatric:         Mood and Affect: Mood normal.       Current Outpatient Medications   Medication Instructions    OLANZapine (ZYPREXA) 5 mg, oral, Nightly, For 4 days starting the evening of treatment.    ondansetron (ZOFRAN) 8 mg, oral, Every 8 hours PRN    predniSONE (Deltasone) 50 mg tablet Take 100 mg by mouth on Days 1, 2, 3, 4, and 5 of treatment cycle.    prochlorperazine (COMPAZINE) 10 mg, oral, Every 6 hours PRN      Laboratory:  The pertinent laboratory results were reviewed and discussed with the patient.    Lab Results   Component Value Date    WBC 4.5 10/23/2024    HCT 34.6 (L) 10/23/2024    HGB 11.0 (L) 10/23/2024     10/23/2024    K 4.3 10/23/2024    CALCIUM 9.0 10/23/2024     10/23/2024    BILITOT 0.6 10/23/2024    ALT 10 10/23/2024    AST 14 10/23/2024    BUN 18 10/23/2024    CREATININE 1.11 10/23/2024      Note: for a comprehensive list of the patient's lab results, access the Results Review activity.    RTC:  11 Dr. Daley follow up  11/13 C4 R-CHOP    Ethel Plasencia, APRN-CNP

## 2024-11-07 ENCOUNTER — LAB (OUTPATIENT)
Dept: LAB | Facility: HOSPITAL | Age: 64
End: 2024-11-07
Payer: MEDICARE

## 2024-11-07 ENCOUNTER — OFFICE VISIT (OUTPATIENT)
Dept: HEMATOLOGY/ONCOLOGY | Facility: HOSPITAL | Age: 64
End: 2024-11-07
Payer: MEDICARE

## 2024-11-07 VITALS
WEIGHT: 167.9 LBS | TEMPERATURE: 97.2 F | SYSTOLIC BLOOD PRESSURE: 130 MMHG | BODY MASS INDEX: 22.57 KG/M2 | HEART RATE: 70 BPM | OXYGEN SATURATION: 97 % | DIASTOLIC BLOOD PRESSURE: 70 MMHG

## 2024-11-07 DIAGNOSIS — C83.38 DIFFUSE LARGE B-CELL LYMPHOMA OF LYMPH NODES OF MULTIPLE REGIONS (MULTI): ICD-10-CM

## 2024-11-07 LAB
ALBUMIN SERPL BCP-MCNC: 4.1 G/DL (ref 3.4–5)
ALP SERPL-CCNC: 77 U/L (ref 33–136)
ALT SERPL W P-5'-P-CCNC: 10 U/L (ref 10–52)
ANION GAP SERPL CALC-SCNC: 13 MMOL/L (ref 10–20)
AST SERPL W P-5'-P-CCNC: 14 U/L (ref 9–39)
BASOPHILS # BLD AUTO: 0.08 X10*3/UL (ref 0–0.1)
BASOPHILS NFR BLD AUTO: 0.8 %
BILIRUB SERPL-MCNC: 0.4 MG/DL (ref 0–1.2)
BUN SERPL-MCNC: 17 MG/DL (ref 6–23)
CALCIUM SERPL-MCNC: 9.5 MG/DL (ref 8.6–10.3)
CHLORIDE SERPL-SCNC: 104 MMOL/L (ref 98–107)
CO2 SERPL-SCNC: 27 MMOL/L (ref 21–32)
CREAT SERPL-MCNC: 1.08 MG/DL (ref 0.5–1.3)
EGFRCR SERPLBLD CKD-EPI 2021: 76 ML/MIN/1.73M*2
EOSINOPHIL # BLD AUTO: 0 X10*3/UL (ref 0–0.7)
EOSINOPHIL NFR BLD AUTO: 0 %
ERYTHROCYTE [DISTWIDTH] IN BLOOD BY AUTOMATED COUNT: 15.9 % (ref 11.5–14.5)
GLUCOSE SERPL-MCNC: 102 MG/DL (ref 74–99)
HCT VFR BLD AUTO: 34.3 % (ref 41–52)
HGB BLD-MCNC: 11.1 G/DL (ref 13.5–17.5)
IMM GRANULOCYTES # BLD AUTO: 0.38 X10*3/UL (ref 0–0.7)
IMM GRANULOCYTES NFR BLD AUTO: 3.6 % (ref 0–0.9)
LDH SERPL L TO P-CCNC: 203 U/L (ref 84–246)
LYMPHOCYTES # BLD AUTO: 0.52 X10*3/UL (ref 1.2–4.8)
LYMPHOCYTES NFR BLD AUTO: 4.9 %
MCH RBC QN AUTO: 30.2 PG (ref 26–34)
MCHC RBC AUTO-ENTMCNC: 32.4 G/DL (ref 32–36)
MCV RBC AUTO: 94 FL (ref 80–100)
MONOCYTES # BLD AUTO: 0.56 X10*3/UL (ref 0.1–1)
MONOCYTES NFR BLD AUTO: 5.3 %
NEUTROPHILS # BLD AUTO: 8.99 X10*3/UL (ref 1.2–7.7)
NEUTROPHILS NFR BLD AUTO: 85.4 %
NRBC BLD-RTO: 0 /100 WBCS (ref 0–0)
PLATELET # BLD AUTO: 200 X10*3/UL (ref 150–450)
POTASSIUM SERPL-SCNC: 4.6 MMOL/L (ref 3.5–5.3)
PROT SERPL-MCNC: 7.1 G/DL (ref 6.4–8.2)
RBC # BLD AUTO: 3.67 X10*6/UL (ref 4.5–5.9)
SODIUM SERPL-SCNC: 139 MMOL/L (ref 136–145)
WBC # BLD AUTO: 10.5 X10*3/UL (ref 4.4–11.3)

## 2024-11-07 PROCEDURE — 1157F ADVNC CARE PLAN IN RCRD: CPT | Performed by: INTERNAL MEDICINE

## 2024-11-07 PROCEDURE — 99215 OFFICE O/P EST HI 40 MIN: CPT | Performed by: INTERNAL MEDICINE

## 2024-11-07 PROCEDURE — 85025 COMPLETE CBC W/AUTO DIFF WBC: CPT

## 2024-11-07 PROCEDURE — 1126F AMNT PAIN NOTED NONE PRSNT: CPT | Performed by: INTERNAL MEDICINE

## 2024-11-07 PROCEDURE — 83615 LACTATE (LD) (LDH) ENZYME: CPT

## 2024-11-07 PROCEDURE — 36415 COLL VENOUS BLD VENIPUNCTURE: CPT

## 2024-11-07 PROCEDURE — 80053 COMPREHEN METABOLIC PANEL: CPT

## 2024-11-07 RX ORDER — PROCHLORPERAZINE MALEATE 10 MG
10 TABLET ORAL EVERY 6 HOURS PRN
OUTPATIENT
Start: 2024-12-04

## 2024-11-07 RX ORDER — FAMOTIDINE 10 MG/ML
20 INJECTION INTRAVENOUS ONCE AS NEEDED
OUTPATIENT
Start: 2024-11-13

## 2024-11-07 RX ORDER — DIPHENHYDRAMINE HCL 50 MG
50 CAPSULE ORAL ONCE
OUTPATIENT
Start: 2024-12-26

## 2024-11-07 RX ORDER — ALBUTEROL SULFATE 0.83 MG/ML
3 SOLUTION RESPIRATORY (INHALATION) AS NEEDED
OUTPATIENT
Start: 2024-11-13

## 2024-11-07 RX ORDER — DIPHENHYDRAMINE HYDROCHLORIDE 50 MG/ML
50 INJECTION INTRAMUSCULAR; INTRAVENOUS AS NEEDED
OUTPATIENT
Start: 2024-12-26

## 2024-11-07 RX ORDER — PALONOSETRON 0.05 MG/ML
0.25 INJECTION, SOLUTION INTRAVENOUS ONCE
OUTPATIENT
Start: 2024-12-26

## 2024-11-07 RX ORDER — PROCHLORPERAZINE EDISYLATE 5 MG/ML
10 INJECTION INTRAMUSCULAR; INTRAVENOUS EVERY 6 HOURS PRN
OUTPATIENT
Start: 2024-11-13

## 2024-11-07 RX ORDER — DOXORUBICIN HYDROCHLORIDE 2 MG/ML
50 INJECTION, SOLUTION INTRAVENOUS ONCE
OUTPATIENT
Start: 2024-11-13

## 2024-11-07 RX ORDER — ALBUTEROL SULFATE 0.83 MG/ML
3 SOLUTION RESPIRATORY (INHALATION) AS NEEDED
OUTPATIENT
Start: 2024-12-04

## 2024-11-07 RX ORDER — PROCHLORPERAZINE MALEATE 10 MG
10 TABLET ORAL EVERY 6 HOURS PRN
OUTPATIENT
Start: 2024-12-26

## 2024-11-07 RX ORDER — ACETAMINOPHEN 325 MG/1
650 TABLET ORAL ONCE
OUTPATIENT
Start: 2024-12-04

## 2024-11-07 RX ORDER — ACETAMINOPHEN 325 MG/1
650 TABLET ORAL ONCE
OUTPATIENT
Start: 2024-11-13

## 2024-11-07 RX ORDER — DIPHENHYDRAMINE HYDROCHLORIDE 50 MG/ML
50 INJECTION INTRAMUSCULAR; INTRAVENOUS AS NEEDED
OUTPATIENT
Start: 2024-11-13

## 2024-11-07 RX ORDER — DOXORUBICIN HYDROCHLORIDE 2 MG/ML
50 INJECTION, SOLUTION INTRAVENOUS ONCE
OUTPATIENT
Start: 2024-12-04

## 2024-11-07 RX ORDER — PALONOSETRON 0.05 MG/ML
0.25 INJECTION, SOLUTION INTRAVENOUS ONCE
OUTPATIENT
Start: 2024-12-04

## 2024-11-07 RX ORDER — PROCHLORPERAZINE MALEATE 10 MG
10 TABLET ORAL EVERY 6 HOURS PRN
OUTPATIENT
Start: 2024-11-13

## 2024-11-07 RX ORDER — ALBUTEROL SULFATE 0.83 MG/ML
3 SOLUTION RESPIRATORY (INHALATION) AS NEEDED
OUTPATIENT
Start: 2024-12-26

## 2024-11-07 RX ORDER — EPINEPHRINE 0.3 MG/.3ML
0.3 INJECTION SUBCUTANEOUS EVERY 5 MIN PRN
OUTPATIENT
Start: 2024-12-04

## 2024-11-07 RX ORDER — AZITHROMYCIN 250 MG/1
250 TABLET, FILM COATED ORAL DAILY
Qty: 6 TABLET | Refills: 0 | Status: SHIPPED | OUTPATIENT
Start: 2024-11-07 | End: 2024-11-12

## 2024-11-07 RX ORDER — DIPHENHYDRAMINE HCL 50 MG
50 CAPSULE ORAL ONCE
OUTPATIENT
Start: 2024-12-04

## 2024-11-07 RX ORDER — EPINEPHRINE 0.3 MG/.3ML
0.3 INJECTION SUBCUTANEOUS EVERY 5 MIN PRN
OUTPATIENT
Start: 2024-12-26

## 2024-11-07 RX ORDER — PROCHLORPERAZINE EDISYLATE 5 MG/ML
10 INJECTION INTRAMUSCULAR; INTRAVENOUS EVERY 6 HOURS PRN
OUTPATIENT
Start: 2024-12-04

## 2024-11-07 RX ORDER — ACETAMINOPHEN 325 MG/1
650 TABLET ORAL ONCE
OUTPATIENT
Start: 2024-12-26

## 2024-11-07 RX ORDER — PROCHLORPERAZINE EDISYLATE 5 MG/ML
10 INJECTION INTRAMUSCULAR; INTRAVENOUS EVERY 6 HOURS PRN
OUTPATIENT
Start: 2024-12-26

## 2024-11-07 RX ORDER — PALONOSETRON 0.05 MG/ML
0.25 INJECTION, SOLUTION INTRAVENOUS ONCE
OUTPATIENT
Start: 2024-11-13

## 2024-11-07 RX ORDER — DIPHENHYDRAMINE HYDROCHLORIDE 50 MG/ML
50 INJECTION INTRAMUSCULAR; INTRAVENOUS AS NEEDED
OUTPATIENT
Start: 2024-12-04

## 2024-11-07 RX ORDER — DOXORUBICIN HYDROCHLORIDE 2 MG/ML
50 INJECTION, SOLUTION INTRAVENOUS ONCE
OUTPATIENT
Start: 2024-12-26

## 2024-11-07 RX ORDER — DIPHENHYDRAMINE HCL 50 MG
50 CAPSULE ORAL ONCE
OUTPATIENT
Start: 2024-11-13

## 2024-11-07 RX ORDER — FAMOTIDINE 10 MG/ML
20 INJECTION INTRAVENOUS ONCE AS NEEDED
OUTPATIENT
Start: 2024-12-26

## 2024-11-07 RX ORDER — EPINEPHRINE 0.3 MG/.3ML
0.3 INJECTION SUBCUTANEOUS EVERY 5 MIN PRN
OUTPATIENT
Start: 2024-11-13

## 2024-11-07 RX ORDER — FAMOTIDINE 10 MG/ML
20 INJECTION INTRAVENOUS ONCE AS NEEDED
OUTPATIENT
Start: 2024-12-04

## 2024-11-07 ASSESSMENT — ENCOUNTER SYMPTOMS
CARDIOVASCULAR NEGATIVE: 1
ENDOCRINE NEGATIVE: 1
CONSTITUTIONAL NEGATIVE: 1
PSYCHIATRIC NEGATIVE: 1
NEUROLOGICAL NEGATIVE: 1
MUSCULOSKELETAL NEGATIVE: 1
EYES NEGATIVE: 1
HEMATOLOGIC/LYMPHATIC NEGATIVE: 1
RESPIRATORY NEGATIVE: 1
GASTROINTESTINAL NEGATIVE: 1

## 2024-11-07 ASSESSMENT — PAIN SCALES - GENERAL: PAINLEVEL_OUTOF10: 0-NO PAIN

## 2024-11-07 NOTE — PROGRESS NOTES
Patient ID: Edmund Matthews is a 64 y.o. male.  Referring Physician: Heber Daley MD PhD  [address] [place] [address]  Primary Care Provider: Mp Bee MD      Subjective    The patient has been in excellent health, but was recently diagnosed with early onset Alzheimer's. He is not on treatments, but chose to retire early. On 7/3/2024 he noted swelling on the left side of the nose and cheek bone. No pain. This was quickly worked up, including a visit with Dr. Mitchell of [facility], and biopsy on 8/9/2024, showing   A: MAXILLA, BIOPSY:  --  LIMITED SAMPLE WITH EXTENSIVE CRUSH ARTIFACT; FINDINGS CONCERNING FOR LARGE B-CELL LYMPHOMA, FINAL CLASSIFICATION PENDING GENETIC STUDIES.  SUBTYPE BY BUD CRITERIA: Germinal center type (CD10-, BCL6+, MUM1-).   MYC/BCL2 EXPRESSOR PHENOTYPE: Not a double expressor (BCL2+, C-MYC-).    9/26/2024: he tolerated first dose of RCHOP on 9/11. Today he feels well, but does have mild dry cough. Feels mild tingling sensation in the left face. No fever wt loss or night sweats. No pain or headache. No change in vision or swallowing. Memory deficit is mild and stable. He then received C2-3 on 10/2 and 10/23, respectively.     11/7: today he says he was very tired for 1 week after each chemo. He also developed a dry cough last week, but has no fever. The symptom is improving.           Review of Systems   Constitutional:  Positive for fatigue.   Eyes: Negative.    Respiratory:  Positive for cough.    Cardiovascular: Negative.    Gastrointestinal: Negative.    Endocrine: Negative.    Genitourinary: Negative.     Musculoskeletal: Negative.    Skin: Negative.    Neurological: Negative.    Hematological: Negative.    Psychiatric/Behavioral: Negative.          Objective   BSA: 1.97 meters squared  /70 (BP Location: Left arm, Patient Position: Sitting, BP Cuff Size: Adult)   Pulse 70   Temp 36.2 °C (97.2 °F) (Temporal)   Wt 76.2 kg (167 lb 14.4 oz)   SpO2 97%   BMI 22.57 kg/m²      No family history on file.  Oncology History   Diffuse large B-cell lymphoma of lymph nodes of multiple regions (Multi)   8/30/2024 Initial Diagnosis    Diffuse large B-cell lymphoma of lymph nodes of multiple regions (Multi)     9/11/2024 -  Chemotherapy    R-CHOP (Cyclophosphamide / DOXOrubicin / VinCRIStine / PredniSONE) + RiTUXimab, 21 Day Cycles         Edmund Matthews  reports that he has never smoked. He has never used smokeless tobacco.  He  has no history on file for alcohol use.  He  has no history on file for drug use.    Physical Exam  Constitutional:       General: He is not in acute distress.     Appearance: He is not toxic-appearing.   HENT:      Head: Normocephalic.      Nose: Nose normal.      Comments: A mass without clear border is appreciated between the left side of nose and the cheekbone. Non tender.   -9/26/2024: the mass is less prominent. However, it is not measurable.   -11/7: no mass.      Mouth/Throat:      Mouth: Mucous membranes are moist.   Eyes:      Extraocular Movements: Extraocular movements intact.      Pupils: Pupils are equal, round, and reactive to light.   Cardiovascular:      Rate and Rhythm: Normal rate and regular rhythm.      Heart sounds: No murmur heard.  Pulmonary:      Effort: Pulmonary effort is normal.      Breath sounds: Normal breath sounds.   Abdominal:      General: Bowel sounds are normal.      Palpations: Abdomen is soft. There is no mass.      Tenderness: There is no abdominal tenderness. There is no rebound.   Musculoskeletal:         General: No swelling, tenderness, deformity or signs of injury.      Right lower leg: No edema.      Left lower leg: No edema.   Skin:     Coloration: Skin is not jaundiced.      Findings: No bruising, lesion or rash.   Neurological:      Mental Status: He is alert and oriented to person, place, and time.      Cranial Nerves: No cranial nerve deficit.      Motor: No weakness.      Gait: Gait normal.   Psychiatric:         Mood  and Affect: Mood normal.         Performance Status:  ECOG PS =1.    Assessment/Plan      A/P  ##DLBCL, newly dx, 8/9/2024:  - A: MAXILLA, BIOPSY:  --  LIMITED SAMPLE WITH EXTENSIVE CRUSH ARTIFACT; FINDINGS CONCERNING FOR LARGE B-CELL LYMPHOMA, FINAL CLASSIFICATION PENDING GENETIC STUDIES.  SUBTYPE BY BUD CRITERIA: Germinal center type (CD10-, BCL6+, MUM1-).   MYC/BCL2 EXPRESSOR PHENOTYPE: Not a double expressor (BCL2+, C-MYC-).  -Stage IV due to extranodal involvement (sinus, bone, premasillary soft tissue, orbit, nose) as shown below in the PET of 8/29:  Intensely hypermetabolic mass centered at the left maxilla causing osseous erosion and extending  into the left pre maxillary soft tissues, with involvement of the inferior margin of the left orbit and left lateral nose, as well as the left maxillary sinus with SUV max of 8.4.  -Tumor burden is relatively low, but location is high risk.  -Will need to assess orbits.   -For stage IV DLBCL, will need systemic chemo. R-CHOP is considered a standard. Furthermore, the MARTINEZ 3 is also a reasonable or preferred option, as it randomize RCHOP vs RCHOP + Odronextamab. He is interested in the study. Research staff is on site to help and will follow up on eligibility.   -9/5: due to existing Alzheimer's disease, he is not eligible for the above study. Will pursue the SOC, I.e. RCHOP. The pre-planned treatment is 6 cycles. Will need to complete work up.   -9/26: MRI of the orbits show disease limited to the facial structure but does not show orbital or optic nerve involvement. ECHO was WNL. Will therefore continue RCHOP, c2 on 10/2, then 10/23.   -11/7: Finished 3 cycles of RCHOP, and has mild - moderate fatigue for 1w. Otherwise doing well. Will continue same. Next cycles: 11/13, 12/4, 12/26.     Plan 11/7  -Labs today. If abnormal may need to repeat on 11/13.   -c4 chemo 11/13  -PET/ct on 12/2 requested  -c5 tentatively 12/4  -MAL HEM BEBETO 12/4 (also needs labs prior)  -MD  12/19.   -C6 12/26.  -Send z-pack for coughing.     Time spent: 45 min.        Heber Daley MD PhD

## 2024-11-13 ENCOUNTER — OFFICE VISIT (OUTPATIENT)
Dept: HEMATOLOGY/ONCOLOGY | Facility: HOSPITAL | Age: 64
End: 2024-11-13
Payer: MEDICARE

## 2024-11-13 ENCOUNTER — INFUSION (OUTPATIENT)
Dept: HEMATOLOGY/ONCOLOGY | Facility: HOSPITAL | Age: 64
End: 2024-11-13
Payer: MEDICARE

## 2024-11-13 VITALS
BODY MASS INDEX: 22.04 KG/M2 | TEMPERATURE: 98.1 F | OXYGEN SATURATION: 98 % | DIASTOLIC BLOOD PRESSURE: 67 MMHG | HEART RATE: 68 BPM | RESPIRATION RATE: 18 BRPM | SYSTOLIC BLOOD PRESSURE: 115 MMHG | WEIGHT: 164 LBS

## 2024-11-13 DIAGNOSIS — C83.38 DIFFUSE LARGE B-CELL LYMPHOMA OF LYMPH NODES OF MULTIPLE REGIONS (MULTI): ICD-10-CM

## 2024-11-13 LAB
ALBUMIN SERPL BCP-MCNC: 4 G/DL (ref 3.4–5)
ALP SERPL-CCNC: 68 U/L (ref 33–136)
ALT SERPL W P-5'-P-CCNC: 12 U/L (ref 10–52)
ANION GAP SERPL CALC-SCNC: 11 MMOL/L (ref 10–20)
AST SERPL W P-5'-P-CCNC: 15 U/L (ref 9–39)
BASOPHILS # BLD AUTO: 0.09 X10*3/UL (ref 0–0.1)
BASOPHILS NFR BLD AUTO: 1.5 %
BILIRUB SERPL-MCNC: 0.4 MG/DL (ref 0–1.2)
BUN SERPL-MCNC: 22 MG/DL (ref 6–23)
CALCIUM SERPL-MCNC: 9.1 MG/DL (ref 8.6–10.3)
CHLORIDE SERPL-SCNC: 104 MMOL/L (ref 98–107)
CO2 SERPL-SCNC: 28 MMOL/L (ref 21–32)
CREAT SERPL-MCNC: 1.08 MG/DL (ref 0.5–1.3)
EGFRCR SERPLBLD CKD-EPI 2021: 76 ML/MIN/1.73M*2
EOSINOPHIL # BLD AUTO: 0 X10*3/UL (ref 0–0.7)
EOSINOPHIL NFR BLD AUTO: 0 %
ERYTHROCYTE [DISTWIDTH] IN BLOOD BY AUTOMATED COUNT: 15.9 % (ref 11.5–14.5)
GLUCOSE SERPL-MCNC: 100 MG/DL (ref 74–99)
HCT VFR BLD AUTO: 32 % (ref 41–52)
HGB BLD-MCNC: 10.5 G/DL (ref 13.5–17.5)
IMM GRANULOCYTES # BLD AUTO: 0.08 X10*3/UL (ref 0–0.7)
IMM GRANULOCYTES NFR BLD AUTO: 1.4 % (ref 0–0.9)
LDH SERPL L TO P-CCNC: 180 U/L (ref 84–246)
LYMPHOCYTES # BLD AUTO: 0.43 X10*3/UL (ref 1.2–4.8)
LYMPHOCYTES NFR BLD AUTO: 7.3 %
MCH RBC QN AUTO: 30.6 PG (ref 26–34)
MCHC RBC AUTO-ENTMCNC: 32.8 G/DL (ref 32–36)
MCV RBC AUTO: 93 FL (ref 80–100)
MONOCYTES # BLD AUTO: 0.55 X10*3/UL (ref 0.1–1)
MONOCYTES NFR BLD AUTO: 9.3 %
NEUTROPHILS # BLD AUTO: 4.77 X10*3/UL (ref 1.2–7.7)
NEUTROPHILS NFR BLD AUTO: 80.5 %
NRBC BLD-RTO: 0 /100 WBCS (ref 0–0)
PLATELET # BLD AUTO: 373 X10*3/UL (ref 150–450)
POTASSIUM SERPL-SCNC: 4.4 MMOL/L (ref 3.5–5.3)
PROT SERPL-MCNC: 6.5 G/DL (ref 6.4–8.2)
RBC # BLD AUTO: 3.43 X10*6/UL (ref 4.5–5.9)
SODIUM SERPL-SCNC: 139 MMOL/L (ref 136–145)
WBC # BLD AUTO: 5.9 X10*3/UL (ref 4.4–11.3)

## 2024-11-13 PROCEDURE — 99215 OFFICE O/P EST HI 40 MIN: CPT | Mod: 25

## 2024-11-13 PROCEDURE — 96375 TX/PRO/DX INJ NEW DRUG ADDON: CPT | Mod: INF

## 2024-11-13 PROCEDURE — 85025 COMPLETE CBC W/AUTO DIFF WBC: CPT

## 2024-11-13 PROCEDURE — 96377 APPLICATON ON-BODY INJECTOR: CPT

## 2024-11-13 PROCEDURE — 84075 ASSAY ALKALINE PHOSPHATASE: CPT

## 2024-11-13 PROCEDURE — 2500000001 HC RX 250 WO HCPCS SELF ADMINISTERED DRUGS (ALT 637 FOR MEDICARE OP): Performed by: INTERNAL MEDICINE

## 2024-11-13 PROCEDURE — 96411 CHEMO IV PUSH ADDL DRUG: CPT

## 2024-11-13 PROCEDURE — 2500000004 HC RX 250 GENERAL PHARMACY W/ HCPCS (ALT 636 FOR OP/ED): Mod: JZ,JG | Performed by: INTERNAL MEDICINE

## 2024-11-13 PROCEDURE — 1157F ADVNC CARE PLAN IN RCRD: CPT

## 2024-11-13 PROCEDURE — 96367 TX/PROPH/DG ADDL SEQ IV INF: CPT

## 2024-11-13 PROCEDURE — 1036F TOBACCO NON-USER: CPT

## 2024-11-13 PROCEDURE — 99215 OFFICE O/P EST HI 40 MIN: CPT

## 2024-11-13 PROCEDURE — 83615 LACTATE (LD) (LDH) ENZYME: CPT

## 2024-11-13 PROCEDURE — 96413 CHEMO IV INFUSION 1 HR: CPT

## 2024-11-13 PROCEDURE — 96415 CHEMO IV INFUSION ADDL HR: CPT

## 2024-11-13 PROCEDURE — 2500000004 HC RX 250 GENERAL PHARMACY W/ HCPCS (ALT 636 FOR OP/ED): Performed by: INTERNAL MEDICINE

## 2024-11-13 RX ORDER — DIPHENHYDRAMINE HYDROCHLORIDE 50 MG/ML
50 INJECTION INTRAMUSCULAR; INTRAVENOUS AS NEEDED
Status: DISCONTINUED | OUTPATIENT
Start: 2024-11-13 | End: 2024-11-13 | Stop reason: HOSPADM

## 2024-11-13 RX ORDER — FAMOTIDINE 10 MG/ML
20 INJECTION INTRAVENOUS ONCE AS NEEDED
Status: DISCONTINUED | OUTPATIENT
Start: 2024-11-13 | End: 2024-11-13 | Stop reason: HOSPADM

## 2024-11-13 RX ORDER — DIPHENHYDRAMINE HCL 50 MG
50 CAPSULE ORAL ONCE
Status: COMPLETED | OUTPATIENT
Start: 2024-11-13 | End: 2024-11-13

## 2024-11-13 RX ORDER — PROCHLORPERAZINE MALEATE 10 MG
10 TABLET ORAL EVERY 6 HOURS PRN
Status: DISCONTINUED | OUTPATIENT
Start: 2024-11-13 | End: 2024-11-13 | Stop reason: HOSPADM

## 2024-11-13 RX ORDER — PALONOSETRON 0.05 MG/ML
0.25 INJECTION, SOLUTION INTRAVENOUS ONCE
Status: COMPLETED | OUTPATIENT
Start: 2024-11-13 | End: 2024-11-13

## 2024-11-13 RX ORDER — ALBUTEROL SULFATE 0.83 MG/ML
3 SOLUTION RESPIRATORY (INHALATION) AS NEEDED
Status: DISCONTINUED | OUTPATIENT
Start: 2024-11-13 | End: 2024-11-13 | Stop reason: HOSPADM

## 2024-11-13 RX ORDER — ACETAMINOPHEN 325 MG/1
650 TABLET ORAL ONCE
Status: COMPLETED | OUTPATIENT
Start: 2024-11-13 | End: 2024-11-13

## 2024-11-13 RX ORDER — DOXORUBICIN HYDROCHLORIDE 2 MG/ML
50 INJECTION, SOLUTION INTRAVENOUS ONCE
Status: COMPLETED | OUTPATIENT
Start: 2024-11-13 | End: 2024-11-13

## 2024-11-13 RX ORDER — PROCHLORPERAZINE EDISYLATE 5 MG/ML
10 INJECTION INTRAMUSCULAR; INTRAVENOUS EVERY 6 HOURS PRN
Status: DISCONTINUED | OUTPATIENT
Start: 2024-11-13 | End: 2024-11-13 | Stop reason: HOSPADM

## 2024-11-13 RX ORDER — EPINEPHRINE 0.3 MG/.3ML
0.3 INJECTION SUBCUTANEOUS EVERY 5 MIN PRN
Status: DISCONTINUED | OUTPATIENT
Start: 2024-11-13 | End: 2024-11-13 | Stop reason: HOSPADM

## 2024-11-13 RX ORDER — HEPARIN 100 UNIT/ML
500 SYRINGE INTRAVENOUS AS NEEDED
OUTPATIENT
Start: 2024-11-13

## 2024-11-13 RX ORDER — HEPARIN SODIUM,PORCINE/PF 10 UNIT/ML
50 SYRINGE (ML) INTRAVENOUS AS NEEDED
OUTPATIENT
Start: 2024-11-13

## 2024-11-13 ASSESSMENT — ENCOUNTER SYMPTOMS
CONSTITUTIONAL NEGATIVE: 1
NEUROLOGICAL NEGATIVE: 1
PSYCHIATRIC NEGATIVE: 1
ENDOCRINE NEGATIVE: 1
EYES NEGATIVE: 1
GASTROINTESTINAL NEGATIVE: 1
CARDIOVASCULAR NEGATIVE: 1
MUSCULOSKELETAL NEGATIVE: 1
HEMATOLOGIC/LYMPHATIC NEGATIVE: 1
RESPIRATORY NEGATIVE: 1

## 2024-11-13 ASSESSMENT — PAIN SCALES - GENERAL: PAINLEVEL_OUTOF10: 0-NO PAIN

## 2024-11-13 NOTE — PROGRESS NOTES
Pt arrived ambulatory to infusion for treatment of RCHOP.  Patient states is is fatigued and has nonproductive cough. Tolerated infusions without issue. Discharged in stable condition.

## 2024-11-13 NOTE — PROGRESS NOTES
Patient ID: Edmund Matthews is a 64 y.o. male.  Referring Physician: Heber Daley MD PhD  [address] [place] [address]  Primary Care Provider: Mp Bee MD      Subjective    The patient has been in excellent health, but was recently diagnosed with early onset Alzheimer's. He is not on treatments, but chose to retire early. On 7/3/2024 he noted swelling on the left side of the nose and cheek bone. No pain. This was quickly worked up, including a visit with Dr. Mitchell of [facility], and biopsy on 8/9/2024, showing   A: MAXILLA, BIOPSY:  --  LIMITED SAMPLE WITH EXTENSIVE CRUSH ARTIFACT; FINDINGS CONCERNING FOR LARGE B-CELL LYMPHOMA, FINAL CLASSIFICATION PENDING GENETIC STUDIES.  SUBTYPE BY BUD CRITERIA: Germinal center type (CD10-, BCL6+, MUM1-).   MYC/BCL2 EXPRESSOR PHENOTYPE: Not a double expressor (BCL2+, C-MYC-).    9/26/2024: he tolerated first dose of RCHOP on 9/11. Today he feels well, but does have mild dry cough. Feels mild tingling sensation in the left face. No fever wt loss or night sweats. No pain or headache. No change in vision or swallowing. Memory deficit is mild and stable.    11/13/24: patient presents to malignant hematology clinic for count check and C4 RCHOP. He feels well today, notes a dry cough for 2 weeks, finished Z-pack recently. Denies fevers, chills, n/v/d/c, weight loss, lymphadenopathy, bruising easily, mucositis, edema, chest pain.           Review of Systems   Constitutional: Negative.    HENT:   Positive for lump/mass.    Eyes: Negative.    Respiratory: Negative.     Cardiovascular: Negative.    Gastrointestinal: Negative.    Endocrine: Negative.    Genitourinary: Negative.     Musculoskeletal: Negative.    Skin: Negative.    Neurological: Negative.    Hematological: Negative.    Psychiatric/Behavioral: Negative.          Objective   VS reviewed in flowsheets     No family history on file.  Oncology History   Diffuse large B-cell lymphoma of lymph nodes of multiple regions  (Multi)   8/30/2024 Initial Diagnosis    Diffuse large B-cell lymphoma of lymph nodes of multiple regions (Multi)     9/11/2024 -  Chemotherapy    R-CHOP (Cyclophosphamide / DOXOrubicin / VinCRIStine / PredniSONE) + RiTUXimab, 21 Day Cycles         Edmund Matthews  reports that he has never smoked. He has never used smokeless tobacco.  He  has no history on file for alcohol use.  He  has no history on file for drug use.    Physical Exam  Constitutional:       General: He is not in acute distress.     Appearance: He is not toxic-appearing.   HENT:      Head: Normocephalic.      Nose: Nose normal.      Comments: A mass without clear border is appreciated between the left side of nose and the cheekbone. Non tender.   -9/26/2024: the mass is less prominent. However, it is not measurable.      Mouth/Throat:      Mouth: Mucous membranes are moist.   Eyes:      Extraocular Movements: Extraocular movements intact.      Pupils: Pupils are equal, round, and reactive to light.   Cardiovascular:      Rate and Rhythm: Normal rate and regular rhythm.      Heart sounds: No murmur heard.  Pulmonary:      Effort: Pulmonary effort is normal.      Breath sounds: Normal breath sounds.   Abdominal:      General: Bowel sounds are normal.      Palpations: Abdomen is soft. There is no mass.      Tenderness: There is no abdominal tenderness. There is no rebound.   Musculoskeletal:         General: No swelling, tenderness, deformity or signs of injury.      Right lower leg: No edema.      Left lower leg: No edema.   Skin:     Coloration: Skin is not jaundiced.      Findings: No bruising, lesion or rash.   Neurological:      Mental Status: He is alert and oriented to person, place, and time.      Cranial Nerves: No cranial nerve deficit.      Motor: No weakness.      Gait: Gait normal.   Psychiatric:         Mood and Affect: Mood normal.         Performance Status:  Asymptomatic    Assessment/Plan      A/P  ##DLBCL, newly dx, 8/9/2024:  - A:  MAXILLA, BIOPSY:  --  LIMITED SAMPLE WITH EXTENSIVE CRUSH ARTIFACT; FINDINGS CONCERNING FOR LARGE B-CELL LYMPHOMA, FINAL CLASSIFICATION PENDING GENETIC STUDIES.  SUBTYPE BY BUD CRITERIA: Germinal center type (CD10-, BCL6+, MUM1-).   MYC/BCL2 EXPRESSOR PHENOTYPE: Not a double expressor (BCL2+, C-MYC-).  -Stage IV due to extranodal involvement (sinus, bone, premasillary soft tissue, orbit, nose) as shown below in the PET of 8/29:  Intensely hypermetabolic mass centered at the left maxilla causing osseous erosion and extending  into the left pre maxillary soft tissues, with involvement of the inferior margin of the left orbit and left lateral nose, as well as the left maxillary sinus with SUV max of 8.4.  -Tumor burden is relatively low, but location is high risk.  -Will need to assess orbits.   -For stage IV DLBCL, will need systemic chemo. R-CHOP is considered a standard. Furthermore, the MARTINEZ 3 is also a reasonable or preferred option, as it randomize RCHOP vs RCHOP + Odronextamab. He is interested in the study. Research staff is on site to help and will follow up on eligibility.   -9/5: due to existing Alzheimer's disease, he is not eligible for the above study. Will pursue the SOC, I.e. RCHOP. The pre-planned treatment is 6 cycles. Will need to complete work up.   -9/26: MRI of the orbits show disease limited to the facial structure but does not show orbital or optic nerve involvement. ECHO was WNL. Will therefore continue RCHOP, c2 on 10/2, then 10/23.     Plan 11/7  -Labs today. If abnormal may need to repeat on 11/13.   -c4 chemo 11/13  -PET/ct on 12/2 requested  -c5 tentatively 12/4  -MAL HEM BEBETO 12/4 (also needs labs prior)  -MD 12/19.   -C6 12/26.  -Send z-pack for coughing.     9/5:  -RCHOP, c2 on 10/2, then 10/23.   -BEBETO 3W.   -RTC 6w with MD.     Anna Chua, APRN-CNP     [facility]

## 2024-11-19 ASSESSMENT — ENCOUNTER SYMPTOMS
FATIGUE: 1
COUGH: 1

## 2024-12-04 ENCOUNTER — INFUSION (OUTPATIENT)
Dept: HEMATOLOGY/ONCOLOGY | Facility: HOSPITAL | Age: 64
End: 2024-12-04
Payer: MEDICARE

## 2024-12-04 ENCOUNTER — TELEPHONE (OUTPATIENT)
Dept: ADMISSION | Facility: HOSPITAL | Age: 64
End: 2024-12-04

## 2024-12-04 ENCOUNTER — OFFICE VISIT (OUTPATIENT)
Dept: HEMATOLOGY/ONCOLOGY | Facility: HOSPITAL | Age: 64
End: 2024-12-04
Payer: MEDICARE

## 2024-12-04 ENCOUNTER — HOSPITAL ENCOUNTER (OUTPATIENT)
Dept: VASCULAR MEDICINE | Facility: HOSPITAL | Age: 64
Discharge: HOME | End: 2024-12-04
Payer: MEDICARE

## 2024-12-04 VITALS
BODY MASS INDEX: 22.49 KG/M2 | RESPIRATION RATE: 18 BRPM | OXYGEN SATURATION: 100 % | DIASTOLIC BLOOD PRESSURE: 67 MMHG | HEART RATE: 72 BPM | WEIGHT: 167.33 LBS | SYSTOLIC BLOOD PRESSURE: 118 MMHG | TEMPERATURE: 99 F

## 2024-12-04 VITALS — SYSTOLIC BLOOD PRESSURE: 108 MMHG | HEART RATE: 58 BPM | DIASTOLIC BLOOD PRESSURE: 70 MMHG | OXYGEN SATURATION: 97 %

## 2024-12-04 DIAGNOSIS — C83.38 DIFFUSE LARGE B-CELL LYMPHOMA OF LYMPH NODES OF MULTIPLE REGIONS (MULTI): ICD-10-CM

## 2024-12-04 DIAGNOSIS — M79.89 LEFT LEG SWELLING: ICD-10-CM

## 2024-12-04 DIAGNOSIS — M79.89 LEFT LEG SWELLING: Primary | ICD-10-CM

## 2024-12-04 DIAGNOSIS — I82.462 ACUTE DEEP VEIN THROMBOSIS (DVT) OF CALF MUSCLE VEIN OF LEFT LOWER EXTREMITY (MULTI): ICD-10-CM

## 2024-12-04 LAB
ALBUMIN SERPL BCP-MCNC: 4 G/DL (ref 3.4–5)
ALP SERPL-CCNC: 73 U/L (ref 33–136)
ALT SERPL W P-5'-P-CCNC: 30 U/L (ref 10–52)
ANION GAP SERPL CALC-SCNC: 11 MMOL/L (ref 10–20)
AST SERPL W P-5'-P-CCNC: 31 U/L (ref 9–39)
BASOPHILS # BLD AUTO: 0.04 X10*3/UL (ref 0–0.1)
BASOPHILS NFR BLD AUTO: 0.9 %
BILIRUB SERPL-MCNC: 0.6 MG/DL (ref 0–1.2)
BUN SERPL-MCNC: 20 MG/DL (ref 6–23)
CALCIUM SERPL-MCNC: 9.3 MG/DL (ref 8.6–10.3)
CHLORIDE SERPL-SCNC: 105 MMOL/L (ref 98–107)
CO2 SERPL-SCNC: 28 MMOL/L (ref 21–32)
CREAT SERPL-MCNC: 1.07 MG/DL (ref 0.5–1.3)
EGFRCR SERPLBLD CKD-EPI 2021: 77 ML/MIN/1.73M*2
EOSINOPHIL # BLD AUTO: 0 X10*3/UL (ref 0–0.7)
EOSINOPHIL NFR BLD AUTO: 0 %
ERYTHROCYTE [DISTWIDTH] IN BLOOD BY AUTOMATED COUNT: 17.8 % (ref 11.5–14.5)
GLUCOSE SERPL-MCNC: 99 MG/DL (ref 74–99)
HCT VFR BLD AUTO: 31.4 % (ref 41–52)
HGB BLD-MCNC: 10.4 G/DL (ref 13.5–17.5)
IMM GRANULOCYTES # BLD AUTO: 0.03 X10*3/UL (ref 0–0.7)
IMM GRANULOCYTES NFR BLD AUTO: 0.7 % (ref 0–0.9)
LDH SERPL L TO P-CCNC: 198 U/L (ref 84–246)
LYMPHOCYTES # BLD AUTO: 0.42 X10*3/UL (ref 1.2–4.8)
LYMPHOCYTES NFR BLD AUTO: 9.9 %
MCH RBC QN AUTO: 31.5 PG (ref 26–34)
MCHC RBC AUTO-ENTMCNC: 33.1 G/DL (ref 32–36)
MCV RBC AUTO: 95 FL (ref 80–100)
MONOCYTES # BLD AUTO: 0.34 X10*3/UL (ref 0.1–1)
MONOCYTES NFR BLD AUTO: 8 %
NEUTROPHILS # BLD AUTO: 3.42 X10*3/UL (ref 1.2–7.7)
NEUTROPHILS NFR BLD AUTO: 80.5 %
NRBC BLD-RTO: 0 /100 WBCS (ref 0–0)
PLATELET # BLD AUTO: 259 X10*3/UL (ref 150–450)
POTASSIUM SERPL-SCNC: 4.3 MMOL/L (ref 3.5–5.3)
PROT SERPL-MCNC: 6.6 G/DL (ref 6.4–8.2)
RBC # BLD AUTO: 3.3 X10*6/UL (ref 4.5–5.9)
SODIUM SERPL-SCNC: 140 MMOL/L (ref 136–145)
WBC # BLD AUTO: 4.3 X10*3/UL (ref 4.4–11.3)

## 2024-12-04 PROCEDURE — 2500000004 HC RX 250 GENERAL PHARMACY W/ HCPCS (ALT 636 FOR OP/ED): Performed by: INTERNAL MEDICINE

## 2024-12-04 PROCEDURE — 1125F AMNT PAIN NOTED PAIN PRSNT: CPT | Performed by: STUDENT IN AN ORGANIZED HEALTH CARE EDUCATION/TRAINING PROGRAM

## 2024-12-04 PROCEDURE — 93970 EXTREMITY STUDY: CPT

## 2024-12-04 PROCEDURE — 85025 COMPLETE CBC W/AUTO DIFF WBC: CPT

## 2024-12-04 PROCEDURE — 1157F ADVNC CARE PLAN IN RCRD: CPT | Performed by: STUDENT IN AN ORGANIZED HEALTH CARE EDUCATION/TRAINING PROGRAM

## 2024-12-04 PROCEDURE — 93970 EXTREMITY STUDY: CPT | Performed by: INTERNAL MEDICINE

## 2024-12-04 PROCEDURE — 96413 CHEMO IV INFUSION 1 HR: CPT

## 2024-12-04 PROCEDURE — 96367 TX/PROPH/DG ADDL SEQ IV INF: CPT

## 2024-12-04 PROCEDURE — 96411 CHEMO IV PUSH ADDL DRUG: CPT

## 2024-12-04 PROCEDURE — 1036F TOBACCO NON-USER: CPT | Performed by: STUDENT IN AN ORGANIZED HEALTH CARE EDUCATION/TRAINING PROGRAM

## 2024-12-04 PROCEDURE — 96377 APPLICATON ON-BODY INJECTOR: CPT

## 2024-12-04 PROCEDURE — 2500000004 HC RX 250 GENERAL PHARMACY W/ HCPCS (ALT 636 FOR OP/ED): Mod: JZ,JG | Performed by: INTERNAL MEDICINE

## 2024-12-04 PROCEDURE — 1159F MED LIST DOCD IN RCRD: CPT | Performed by: STUDENT IN AN ORGANIZED HEALTH CARE EDUCATION/TRAINING PROGRAM

## 2024-12-04 PROCEDURE — 99215 OFFICE O/P EST HI 40 MIN: CPT | Performed by: STUDENT IN AN ORGANIZED HEALTH CARE EDUCATION/TRAINING PROGRAM

## 2024-12-04 PROCEDURE — 83615 LACTATE (LD) (LDH) ENZYME: CPT

## 2024-12-04 PROCEDURE — 96417 CHEMO IV INFUS EACH ADDL SEQ: CPT

## 2024-12-04 PROCEDURE — 84075 ASSAY ALKALINE PHOSPHATASE: CPT

## 2024-12-04 PROCEDURE — 96375 TX/PRO/DX INJ NEW DRUG ADDON: CPT | Mod: INF

## 2024-12-04 PROCEDURE — 2500000001 HC RX 250 WO HCPCS SELF ADMINISTERED DRUGS (ALT 637 FOR MEDICARE OP): Performed by: INTERNAL MEDICINE

## 2024-12-04 RX ORDER — EPINEPHRINE 0.3 MG/.3ML
0.3 INJECTION SUBCUTANEOUS EVERY 5 MIN PRN
Status: DISCONTINUED | OUTPATIENT
Start: 2024-12-04 | End: 2024-12-04 | Stop reason: HOSPADM

## 2024-12-04 RX ORDER — PROCHLORPERAZINE EDISYLATE 5 MG/ML
10 INJECTION INTRAMUSCULAR; INTRAVENOUS EVERY 6 HOURS PRN
Status: DISCONTINUED | OUTPATIENT
Start: 2024-12-04 | End: 2024-12-04 | Stop reason: HOSPADM

## 2024-12-04 RX ORDER — PROCHLORPERAZINE MALEATE 10 MG
10 TABLET ORAL EVERY 6 HOURS PRN
Status: DISCONTINUED | OUTPATIENT
Start: 2024-12-04 | End: 2024-12-04 | Stop reason: HOSPADM

## 2024-12-04 RX ORDER — PALONOSETRON 0.05 MG/ML
0.25 INJECTION, SOLUTION INTRAVENOUS ONCE
Status: COMPLETED | OUTPATIENT
Start: 2024-12-04 | End: 2024-12-04

## 2024-12-04 RX ORDER — DIPHENHYDRAMINE HYDROCHLORIDE 50 MG/ML
50 INJECTION INTRAMUSCULAR; INTRAVENOUS AS NEEDED
Status: DISCONTINUED | OUTPATIENT
Start: 2024-12-04 | End: 2024-12-04 | Stop reason: HOSPADM

## 2024-12-04 RX ORDER — FAMOTIDINE 10 MG/ML
20 INJECTION INTRAVENOUS ONCE AS NEEDED
Status: DISCONTINUED | OUTPATIENT
Start: 2024-12-04 | End: 2024-12-04 | Stop reason: HOSPADM

## 2024-12-04 RX ORDER — ENOXAPARIN SODIUM 100 MG/ML
80 INJECTION SUBCUTANEOUS EVERY 12 HOURS
Qty: 16 EACH | Refills: 0 | Status: SHIPPED | OUTPATIENT
Start: 2024-12-04 | End: 2024-12-12

## 2024-12-04 RX ORDER — ENOXAPARIN SODIUM 100 MG/ML
80 INJECTION SUBCUTANEOUS EVERY 12 HOURS
Qty: 16 EACH | Refills: 0 | Status: SHIPPED | OUTPATIENT
Start: 2024-12-04 | End: 2024-12-04

## 2024-12-04 RX ORDER — DOXORUBICIN HYDROCHLORIDE 2 MG/ML
50 INJECTION, SOLUTION INTRAVENOUS ONCE
Status: COMPLETED | OUTPATIENT
Start: 2024-12-04 | End: 2024-12-04

## 2024-12-04 RX ORDER — DIPHENHYDRAMINE HCL 50 MG
50 CAPSULE ORAL ONCE
Status: COMPLETED | OUTPATIENT
Start: 2024-12-04 | End: 2024-12-04

## 2024-12-04 RX ORDER — ALBUTEROL SULFATE 0.83 MG/ML
3 SOLUTION RESPIRATORY (INHALATION) AS NEEDED
Status: DISCONTINUED | OUTPATIENT
Start: 2024-12-04 | End: 2024-12-04 | Stop reason: HOSPADM

## 2024-12-04 RX ORDER — ACETAMINOPHEN 325 MG/1
650 TABLET ORAL ONCE
Status: COMPLETED | OUTPATIENT
Start: 2024-12-04 | End: 2024-12-04

## 2024-12-04 ASSESSMENT — PAIN SCALES - GENERAL: PAINLEVEL_OUTOF10: 4

## 2024-12-04 NOTE — PROGRESS NOTES
Rituximab rates verified by ENID Diop RN    137 ml/hr for 68.5 ml  274 ml/hr for remaining 274 ml.     JEWEL Schwarz RN

## 2024-12-04 NOTE — PROGRESS NOTES
Patient ID: Edmund Matthews is a 64 y.o. male.  Referring Physician: Ethel Plasencia, APRN-CNP  [address] [place] [address]  Primary Care Provider: Mp Bee MD      Subjective    Presents today (12/4/2024) for follow up in [facility]. Doing well overall though notes some cramping in his legs, left more than right. Occurs daily, worse with getting up and walking. Some swelling in his legs, left more than his right. No shortness of breath or chest pain. Notes that his cough has been improving, remains dry but less frequent now. No history of blood clots, recent surgeries, or history of A fib.       Objective   Vital signs reviewed in flowsheet.     No family history on file.  Oncology History   Diffuse large B-cell lymphoma of lymph nodes of multiple regions (Multi)   8/30/2024 Initial Diagnosis    Diffuse large B-cell lymphoma of lymph nodes of multiple regions (Multi)    The patient had been in excellent health, but was recently diagnosed with early onset Alzheimer's. He hose to retire early.     On 7/3/2024 he noted swelling on the left side of the nose and cheek bone. No pain. This was quickly worked up, including a visit with Dr. Mitchell of [facility], and biopsy on 8/9/2024, showing:     A: MAXILLA, BIOPSY:  --  LIMITED SAMPLE WITH EXTENSIVE CRUSH ARTIFACT; FINDINGS CONCERNING FOR LARGE B-CELL LYMPHOMA, FINAL CLASSIFICATION PENDING GENETIC STUDIES.  SUBTYPE BY BUD CRITERIA: Germinal center type (CD10-, BCL6+, MUM1-).   MYC/BCL2 EXPRESSOR PHENOTYPE: Not a double expressor (BCL2+, C-MYC-).    -Stage IV due to extranodal involvement (sinus, bone, premasillary soft tissue, orbit, nose) as shown below in the PET of 8/29:  Intensely hypermetabolic mass centered at the left maxilla causing osseous erosion and extending  into the left pre maxillary soft tissues, with involvement of the inferior margin of the left orbit and left lateral nose, as well as the left maxillary sinus with SUV max of  8.4.  -Tumor burden is relatively low, but location is high risk.  -Will need to assess orbits.   -For stage IV DLBCL, will need systemic chemo. R-CHOP is considered a standard. Furthermore, the MARTINEZ 3 is also a reasonable or preferred option, as it randomize RCHOP vs RCHOP + Odronextamab. He is interested in the study. Research staff is on site to help and will follow up on eligibility.   -9/5: due to existing Alzheimer's disease, he is not eligible for the above study. Will pursue the SOC, I.e. RCHOP. The pre-planned treatment is 6 cycles. Will need to complete work up.   -9/26: MRI of the orbits show disease limited to the facial structure but does not show orbital or optic nerve involvement. ECHO was WNL. Will therefore continue RCHOP     9/11/2024 -  Chemotherapy    R-CHOP (Cyclophosphamide / DOXOrubicin / VinCRIStine / PredniSONE) + RiTUXimab, 21 Day Cycles       Edmund Matthews  reports that he has never smoked. He has never used smokeless tobacco.  He  has no history on file for alcohol use.  He  has no history on file for drug use.    Physical Exam  Constitutional:       Appearance: Normal appearance.   Eyes:      Conjunctiva/sclera: Conjunctivae normal.      Pupils: Pupils are equal, round, and reactive to light.   Cardiovascular:      Rate and Rhythm: Normal rate and regular rhythm.   Pulmonary:      Effort: Pulmonary effort is normal.      Breath sounds: Normal breath sounds.   Musculoskeletal:         General: Swelling (L leg > R) present. Normal range of motion.   Skin:     Findings: No rash.   Neurological:      Mental Status: He is alert. Mental status is at baseline.   Psychiatric:         Mood and Affect: Mood normal.         Performance Status:  Asymptomatic    Assessment/Plan      ##DLBCL, dx 8/9/2024:  - A: MAXILLA, BIOPSY:  --  LIMITED SAMPLE WITH EXTENSIVE CRUSH ARTIFACT; FINDINGS CONCERNING FOR LARGE B-CELL LYMPHOMA, FINAL CLASSIFICATION PENDING GENETIC STUDIES.  SUBTYPE BY BUD CRITERIA:  Germinal center type (CD10-, BCL6+, MUM1-).   MYC/BCL2 EXPRESSOR PHENOTYPE: Not a double expressor (BCL2+, C-MYC-).  -Stage IV due to extranodal involvement (sinus, bone, premasillary soft tissue, orbit, nose) as shown below in the PET of 8/29:  Intensely hypermetabolic mass centered at the left maxilla causing osseous erosion and extending  into the left pre maxillary soft tissues, with involvement of the inferior margin of the left orbit and left lateral nose, as well as the left maxillary sinus with SUV max of 8.4.  -Tumor burden is relatively low, but location is high risk.  -Will need to assess orbits.   -For stage IV DLBCL, will need systemic chemo. R-CHOP is considered a standard. Furthermore, the MARTINEZ 3 is also a reasonable or preferred option, as it randomize RCHOP vs RCHOP + Odronextamab. He is interested in the study. Research staff is on site to help and will follow up on eligibility.   -9/5: due to existing Alzheimer's disease, he is not eligible for the above study. Will pursue the SOC, I.e. RCHOP. The pre-planned treatment is 6 cycles. Will need to complete work up.   -9/26: MRI of the orbits show disease limited to the facial structure but does not show orbital or optic nerve involvement. ECHO was WNL. Will therefore continue RCHOP, c2 on 10/2, then 10/23.   -12/4: C5 RCHOP today, evaluation concerning for lower extremity DVT today, confirmed by venous duplex. Started on eliquis. Discussed timing of PET (tomorrow 12/5), will need to delay to just prior to C6. Otherwise tolerating chemotherapy well.     Plan 12/4/2024:   - Bilateral venous duplex US today to r/o DVT  - PET/CT following C4 was requested for 12/2 but scheduled for 12/5 (1 day after C5). Will reschedule to after C5, prior to C6 (requested for 12/23).   - MD 12/19   - C6 tentatively 12/26      Karley Issa PA-C

## 2024-12-04 NOTE — PROGRESS NOTES
[facility] Infusion Nursing Note  12/04/24    Edmund Matthews is a 64 y.o. year old male patient presenting to outpatient infusion for cycle 5 day 1 of the following regimen:    Treatment Plans       Name Type Plan Dates Plan Provider         Active    R-CHOP (Cyclophosphamide / DOXOrubicin / VinCRIStine / PredniSONE) + RiTUXimab, 21 Day Cycles Oncology Treatment 9/10/2024 - Present Heber Daley MD PhD                  Since the last visit, he reports doing well. Overall, he states that energy level is decreased, but able to perform ADLs. Appetite has been unchanged and good. he reports muscle cramps in BL calves, will have DVT ultrasound today.     Line type: PIV-- 20 G placed by IV team d/t previous access difficulty  Line removed/maintained prior to discharge: removed    Administrations This Visit       acetaminophen (Tylenol) tablet 650 mg       Admin Date  12/04/2024 Action  Given Dose  650 mg Route  oral Documented By  Sabiha Schwarz RN              cycloPHOSphamide (Cytoxan) 1,500 mg in sodium chloride 0.9% 342 mL IV       Admin Date  12/04/2024 Action  New Bag Dose  1,500 mg Rate  684 mL/hr Route  intravenous Documented By  Sabiha Schwarz RN              diphenhydrAMINE (BENADryl) capsule 50 mg       Admin Date  12/04/2024 Action  Given Dose  50 mg Route  oral Documented By  Sabiha Schwarz RN              DOXOrubicin (Adriamycin) IV syringe 50 mg/m2 = 100 mg 50 mL       Admin Date  12/04/2024 Action  Given Dose  100 mg Route  intravenous Documented By  Sabiha Schwarz RN              fosaprepitant (Emend) 150 mg in sodium chloride 0.9% 150 mL IV       Admin Date  12/04/2024 Action  New Bag Dose  150 mg Rate  300 mL/hr Route  intravenous Documented By  Sabiha Schwarz RN              palonosetron (Aloxi) injection 250 mcg       Admin Date  12/04/2024 Action  Given Dose  250 mcg Route  intravenous Documented By  Sabiha Schwarz RN              pegfilgrastim (Neulasta Onpro) injection 6 mg        Admin Date  12/04/2024 Action  Given Dose  6 mg Route  subcutaneous Documented By  Sabiha Schwarz RN              riTUXimab-pvvr (Ruxience) 746 mg in sodium chloride 0.9% 341.6 mL IV       Admin Date  12/04/2024 Action  New Bag Dose  746 mg Rate  137 mL/hr Route  intravenous Documented By  Sabiha Schwarz RN               Admin Date  12/04/2024 Action  Rate/Dose Change Dose   Rate  274 mL/hr Route  intravenous Documented By  Sabiha Schwarz RN              vinCRIStine (Oncovin) 2 mg in sodium chloride 0.9% 60 mL IV       Admin Date  12/04/2024 Action  New Bag Dose  2 mg Rate  360 mL/hr Route  intravenous Documented By  Sabiha Schwarz RN                  Hypersensitivity reaction noted: No  Patient tolerated treatment well. Discharged home in stable condition.    Follow-up Plan: 12/12 PET scan, 12/26 infusion    Sabiha Schwarz RN

## 2024-12-05 ENCOUNTER — SPECIALTY PHARMACY (OUTPATIENT)
Dept: PHARMACY | Facility: CLINIC | Age: 64
End: 2024-12-05

## 2024-12-05 ENCOUNTER — APPOINTMENT (OUTPATIENT)
Dept: RADIOLOGY | Facility: HOSPITAL | Age: 64
End: 2024-12-05
Payer: MEDICARE

## 2024-12-05 NOTE — TELEPHONE ENCOUNTER
Pt diagnosed with DVT Today- started on lovenox this evening.   Pt had a quick pain from left to right side of chest that lasted seconds. No dyspnea, radiating pain up neck into arms. No headache, dizziness, change in vision, unilateral weakness, confusion.   Pt states pain is completely relieved.   Family encouraged to seek medical attention is pain returns, increased dyspnea, dizziness, changes in mentation.   Family in agreement with plan.

## 2024-12-10 ENCOUNTER — TELEPHONE (OUTPATIENT)
Dept: ADMISSION | Facility: HOSPITAL | Age: 64
End: 2024-12-10
Payer: MEDICARE

## 2024-12-10 NOTE — TELEPHONE ENCOUNTER
Pt's spouse asking that apixaban 5mg. 1 tablet BID script be sent to   [facility], so they can pick it up closer to home

## 2024-12-11 DIAGNOSIS — M79.89 LEFT LEG SWELLING: ICD-10-CM

## 2024-12-12 ENCOUNTER — SPECIALTY PHARMACY (OUTPATIENT)
Dept: PHARMACY | Facility: CLINIC | Age: 64
End: 2024-12-12

## 2024-12-12 ENCOUNTER — APPOINTMENT (OUTPATIENT)
Dept: RADIOLOGY | Facility: HOSPITAL | Age: 64
End: 2024-12-12
Payer: MEDICARE

## 2024-12-12 ENCOUNTER — PHARMACY VISIT (OUTPATIENT)
Dept: PHARMACY | Facility: CLINIC | Age: 64
End: 2024-12-12
Payer: COMMERCIAL

## 2024-12-12 PROCEDURE — RXMED WILLOW AMBULATORY MEDICATION CHARGE

## 2024-12-19 ENCOUNTER — HOSPITAL ENCOUNTER (OUTPATIENT)
Dept: RADIOLOGY | Facility: HOSPITAL | Age: 64
Discharge: HOME | End: 2024-12-19
Payer: MEDICARE

## 2024-12-19 ENCOUNTER — OFFICE VISIT (OUTPATIENT)
Dept: HEMATOLOGY/ONCOLOGY | Facility: HOSPITAL | Age: 64
End: 2024-12-19
Payer: MEDICARE

## 2024-12-19 ENCOUNTER — LAB (OUTPATIENT)
Dept: LAB | Facility: HOSPITAL | Age: 64
End: 2024-12-19
Payer: MEDICARE

## 2024-12-19 VITALS
WEIGHT: 166.89 LBS | OXYGEN SATURATION: 100 % | BODY MASS INDEX: 22.43 KG/M2 | DIASTOLIC BLOOD PRESSURE: 73 MMHG | SYSTOLIC BLOOD PRESSURE: 122 MMHG | TEMPERATURE: 97.2 F | HEART RATE: 78 BPM | RESPIRATION RATE: 22 BRPM

## 2024-12-19 DIAGNOSIS — C83.38 DIFFUSE LARGE B-CELL LYMPHOMA OF LYMPH NODES OF MULTIPLE REGIONS (MULTI): ICD-10-CM

## 2024-12-19 LAB
ALBUMIN SERPL BCP-MCNC: 4.5 G/DL (ref 3.4–5)
ALP SERPL-CCNC: 76 U/L (ref 33–136)
ALT SERPL W P-5'-P-CCNC: 30 U/L (ref 10–52)
ANION GAP SERPL CALC-SCNC: 13 MMOL/L (ref 10–20)
AST SERPL W P-5'-P-CCNC: 20 U/L (ref 9–39)
BASOPHILS # BLD AUTO: 0.03 X10*3/UL (ref 0–0.1)
BASOPHILS NFR BLD AUTO: 0.6 %
BILIRUB SERPL-MCNC: 0.4 MG/DL (ref 0–1.2)
BUN SERPL-MCNC: 20 MG/DL (ref 6–23)
CALCIUM SERPL-MCNC: 9.4 MG/DL (ref 8.6–10.3)
CHLORIDE SERPL-SCNC: 102 MMOL/L (ref 98–107)
CO2 SERPL-SCNC: 29 MMOL/L (ref 21–32)
CREAT SERPL-MCNC: 1.12 MG/DL (ref 0.5–1.3)
EGFRCR SERPLBLD CKD-EPI 2021: 72 ML/MIN/1.73M*2
EOSINOPHIL # BLD AUTO: 0 X10*3/UL (ref 0–0.7)
EOSINOPHIL NFR BLD AUTO: 0 %
ERYTHROCYTE [DISTWIDTH] IN BLOOD BY AUTOMATED COUNT: 17 % (ref 11.5–14.5)
GLUCOSE BLD MANUAL STRIP-MCNC: 93 MG/DL (ref 74–99)
GLUCOSE SERPL-MCNC: 104 MG/DL (ref 74–99)
HCT VFR BLD AUTO: 31.5 % (ref 41–52)
HGB BLD-MCNC: 10.3 G/DL (ref 13.5–17.5)
IMM GRANULOCYTES # BLD AUTO: 0.09 X10*3/UL (ref 0–0.7)
IMM GRANULOCYTES NFR BLD AUTO: 1.7 % (ref 0–0.9)
LDH SERPL L TO P-CCNC: 153 U/L (ref 84–246)
LYMPHOCYTES # BLD AUTO: 0.49 X10*3/UL (ref 1.2–4.8)
LYMPHOCYTES NFR BLD AUTO: 9.4 %
MCH RBC QN AUTO: 32 PG (ref 26–34)
MCHC RBC AUTO-ENTMCNC: 32.7 G/DL (ref 32–36)
MCV RBC AUTO: 98 FL (ref 80–100)
MONOCYTES # BLD AUTO: 0.55 X10*3/UL (ref 0.1–1)
MONOCYTES NFR BLD AUTO: 10.5 %
NEUTROPHILS # BLD AUTO: 4.07 X10*3/UL (ref 1.2–7.7)
NEUTROPHILS NFR BLD AUTO: 77.8 %
NRBC BLD-RTO: 0 /100 WBCS (ref 0–0)
PLATELET # BLD AUTO: 233 X10*3/UL (ref 150–450)
POTASSIUM SERPL-SCNC: 4 MMOL/L (ref 3.5–5.3)
PROT SERPL-MCNC: 7 G/DL (ref 6.4–8.2)
RBC # BLD AUTO: 3.22 X10*6/UL (ref 4.5–5.9)
SODIUM SERPL-SCNC: 140 MMOL/L (ref 136–145)
WBC # BLD AUTO: 5.2 X10*3/UL (ref 4.4–11.3)

## 2024-12-19 PROCEDURE — A9552 F18 FDG: HCPCS | Performed by: INTERNAL MEDICINE

## 2024-12-19 PROCEDURE — 85025 COMPLETE CBC W/AUTO DIFF WBC: CPT

## 2024-12-19 PROCEDURE — 78815 PET IMAGE W/CT SKULL-THIGH: CPT | Mod: PET TUMOR SUBSQ TX STRATEGY | Performed by: RADIOLOGY

## 2024-12-19 PROCEDURE — 82947 ASSAY GLUCOSE BLOOD QUANT: CPT

## 2024-12-19 PROCEDURE — 36415 COLL VENOUS BLD VENIPUNCTURE: CPT

## 2024-12-19 PROCEDURE — 80053 COMPREHEN METABOLIC PANEL: CPT

## 2024-12-19 PROCEDURE — 3430000001 HC RX 343 DIAGNOSTIC RADIOPHARMACEUTICALS: Performed by: INTERNAL MEDICINE

## 2024-12-19 PROCEDURE — 99215 OFFICE O/P EST HI 40 MIN: CPT | Performed by: INTERNAL MEDICINE

## 2024-12-19 PROCEDURE — 83615 LACTATE (LD) (LDH) ENZYME: CPT

## 2024-12-19 PROCEDURE — 1159F MED LIST DOCD IN RCRD: CPT | Performed by: INTERNAL MEDICINE

## 2024-12-19 PROCEDURE — 78815 PET IMAGE W/CT SKULL-THIGH: CPT | Mod: PS

## 2024-12-19 PROCEDURE — 1126F AMNT PAIN NOTED NONE PRSNT: CPT | Performed by: INTERNAL MEDICINE

## 2024-12-19 PROCEDURE — 1157F ADVNC CARE PLAN IN RCRD: CPT | Performed by: INTERNAL MEDICINE

## 2024-12-19 RX ORDER — FLUDEOXYGLUCOSE F 18 200 MCI/ML
10.68 INJECTION, SOLUTION INTRAVENOUS
Status: COMPLETED | OUTPATIENT
Start: 2024-12-19 | End: 2024-12-19

## 2024-12-19 ASSESSMENT — ENCOUNTER SYMPTOMS
PSYCHIATRIC NEGATIVE: 1
CARDIOVASCULAR NEGATIVE: 1
FATIGUE: 1
NEUROLOGICAL NEGATIVE: 1
EYES NEGATIVE: 1
ENDOCRINE NEGATIVE: 1
COUGH: 1
GASTROINTESTINAL NEGATIVE: 1
MUSCULOSKELETAL NEGATIVE: 1
HEMATOLOGIC/LYMPHATIC NEGATIVE: 1

## 2024-12-19 ASSESSMENT — PAIN SCALES - GENERAL: PAINLEVEL_OUTOF10: 0-NO PAIN

## 2024-12-19 NOTE — PROGRESS NOTES
Patient ID: Edmund Matthews is a 64 y.o. male.  Referring Physician: Ethel Plasencia, APRN-CNP  [address] [place] [address]  Primary Care Provider: Mp Bee MD      Subjective    The patient has been in excellent health, but was recently diagnosed with early onset Alzheimer's. He is not on treatments, but chose to retire early. On 7/3/2024 he noted swelling on the left side of the nose and cheek bone. No pain. This was quickly worked up, including a visit with Dr. Mitchell of [facility], and biopsy on 8/9/2024, showing   A: MAXILLA, BIOPSY:  --  LIMITED SAMPLE WITH EXTENSIVE CRUSH ARTIFACT; FINDINGS CONCERNING FOR LARGE B-CELL LYMPHOMA, FINAL CLASSIFICATION PENDING GENETIC STUDIES.  SUBTYPE BY BUD CRITERIA: Germinal center type (CD10-, BCL6+, MUM1-).   MYC/BCL2 EXPRESSOR PHENOTYPE: Not a double expressor (BCL2+, C-MYC-).    9/26/2024: he tolerated first dose of RCHOP on 9/11. Today he feels well, but does have mild dry cough. Feels mild tingling sensation in the left face. No fever wt loss or night sweats. No pain or headache. No change in vision or swallowing. Memory deficit is mild and stable. He then received C2-3 on 10/2 and 10/23, respectively.     11/7: today he says he was very tired for 1 week after each chemo. He also developed a dry cough last week, but has no fever. The symptom is improving.     12/4: new edema on the left leg. Doppler confirmed DVT. Eliquis started.     12/19: feels more tired. No other complaints. No cough or fever.           Review of Systems   Constitutional:  Positive for fatigue.   Eyes: Negative.    Cardiovascular: Negative.    Gastrointestinal: Negative.    Endocrine: Negative.    Genitourinary: Negative.     Musculoskeletal: Negative.    Skin: Negative.    Neurological: Negative.    Hematological: Negative.    Psychiatric/Behavioral: Negative.          Objective   BSA: 1.97 meters squared  /73 (BP Location: Left arm, Patient Position: Sitting, BP Cuff Size:  Adult)   Pulse 78   Temp 36.2 °C (97.2 °F) (Temporal)   Resp 22   Wt 75.7 kg (166 lb 14.2 oz)   SpO2 100%   BMI 22.43 kg/m²     No family history on file.  Oncology History   Diffuse large B-cell lymphoma of lymph nodes of multiple regions (Multi)   8/30/2024 Initial Diagnosis    Diffuse large B-cell lymphoma of lymph nodes of multiple regions (Multi)    The patient had been in excellent health, but was recently diagnosed with early onset Alzheimer's. He hose to retire early.     On 7/3/2024 he noted swelling on the left side of the nose and cheek bone. No pain. This was quickly worked up, including a visit with Dr. Mitchell of [facility], and biopsy on 8/9/2024, showing:     A: MAXILLA, BIOPSY:  --  LIMITED SAMPLE WITH EXTENSIVE CRUSH ARTIFACT; FINDINGS CONCERNING FOR LARGE B-CELL LYMPHOMA, FINAL CLASSIFICATION PENDING GENETIC STUDIES.  SUBTYPE BY BUD CRITERIA: Germinal center type (CD10-, BCL6+, MUM1-).   MYC/BCL2 EXPRESSOR PHENOTYPE: Not a double expressor (BCL2+, C-MYC-).    -Stage IV due to extranodal involvement (sinus, bone, premasillary soft tissue, orbit, nose) as shown below in the PET of 8/29:  Intensely hypermetabolic mass centered at the left maxilla causing osseous erosion and extending  into the left pre maxillary soft tissues, with involvement of the inferior margin of the left orbit and left lateral nose, as well as the left maxillary sinus with SUV max of 8.4.  -Tumor burden is relatively low, but location is high risk.  -Will need to assess orbits.   -For stage IV DLBCL, will need systemic chemo. R-CHOP is considered a standard. Furthermore, the MARTINEZ 3 is also a reasonable or preferred option, as it randomize RCHOP vs RCHOP + Odronextamab. He is interested in the study. Research staff is on site to help and will follow up on eligibility.   -9/5: due to existing Alzheimer's disease, he is not eligible for the above study. Will pursue the SOC, I.e. RCHOP. The pre-planned treatment is 6 cycles. Will  need to complete work up.   -9/26: MRI of the orbits show disease limited to the facial structure but does not show orbital or optic nerve involvement. ECHO was WNL. Will therefore continue [facility]     9/11/2024 -  Chemotherapy    R-CHOP (Cyclophosphamide / DOXOrubicin / VinCRIStine / PredniSONE) + RiTUXimab, 21 Day Cycles         Edmudn Matthews  reports that he has never smoked. He has never used smokeless tobacco.  He  has no history on file for alcohol use.  He  has no history on file for drug use.    Physical Exam  Constitutional:       General: He is not in acute distress.     Appearance: He is not toxic-appearing.   HENT:      Head: Normocephalic.      Nose: Nose normal.      Comments: A mass without clear border is appreciated between the left side of nose and the cheekbone. Non tender.   -9/26/2024: the mass is less prominent. However, it is not measurable.   -11/7: no mass.      Mouth/Throat:      Mouth: Mucous membranes are moist.   Eyes:      Extraocular Movements: Extraocular movements intact.      Pupils: Pupils are equal, round, and reactive to light.   Cardiovascular:      Rate and Rhythm: Normal rate and regular rhythm.      Heart sounds: No murmur heard.  Pulmonary:      Effort: Pulmonary effort is normal.      Breath sounds: Normal breath sounds.   Abdominal:      General: Bowel sounds are normal.      Palpations: Abdomen is soft. There is no mass.      Tenderness: There is no abdominal tenderness. There is no rebound.   Musculoskeletal:         General: No swelling, tenderness, deformity or signs of injury.      Right lower leg: No edema.      Left lower leg: No edema.   Skin:     Coloration: Skin is not jaundiced.      Findings: No bruising, lesion or rash.   Neurological:      Mental Status: He is alert and oriented to person, place, and time.      Cranial Nerves: No cranial nerve deficit.      Motor: No weakness.      Gait: Gait normal.   Psychiatric:         Mood and Affect: Mood normal.        Performance Status:  ECOG PS =1.    Assessment/Plan      A/P  ##DLBCL, newly dx, 8/9/2024:  - A: MAXILLA, BIOPSY:  --  LIMITED SAMPLE WITH EXTENSIVE CRUSH ARTIFACT; FINDINGS CONCERNING FOR LARGE B-CELL LYMPHOMA, FINAL CLASSIFICATION PENDING GENETIC STUDIES.  SUBTYPE BY BUD CRITERIA: Germinal center type (CD10-, BCL6+, MUM1-).   MYC/BCL2 EXPRESSOR PHENOTYPE: Not a double expressor (BCL2+, C-MYC-).  -Stage IV due to extranodal involvement (sinus, bone, premasillary soft tissue, orbit, nose) as shown below in the PET of 8/29:  Intensely hypermetabolic mass centered at the left maxilla causing osseous erosion and extending  into the left pre maxillary soft tissues, with involvement of the inferior margin of the left orbit and left lateral nose, as well as the left maxillary sinus with SUV max of 8.4.  -Tumor burden is relatively low, but location is high risk.  -Will need to assess orbits.   -For stage IV DLBCL, will need systemic chemo. R-CHOP is considered a standard. Furthermore, the MARTINEZ 3 is also a reasonable or preferred option, as it randomize RCHOP vs RCHOP + Odronextamab. He is interested in the study. Research staff is on site to help and will follow up on eligibility.   -9/5: due to existing Alzheimer's disease, he is not eligible for the above study. Will pursue the SOC, I.e. RCHOP. The pre-planned treatment is 6 cycles. Will need to complete work up.   -9/26: MRI of the orbits show disease limited to the facial structure but does not show orbital or optic nerve involvement. ECHO was WNL. Will therefore continue RCHOP, c2 on 10/2, then 10/23.   -11/7: Finished 3 cycles of RCHOP, and has mild - moderate fatigue for 1w. Otherwise doing well. Will continue same. Next cycles: 11/13, 12/4, 12/26.   -12/19: started c5 RCHOP on 12/4, and tolerated well, but still has fatigue. Unexpectedly he developed DVT on 12/4 in the left leg. Had PET/CT today, results pending. C6 RCHOP is tentatively scheduled on  12/26.      #DVT  -see above. On eliquis since 12/4, with improvement.    Plan 12/19:  Had PET/CT today, results pending. C6 RCHOP is tentatively scheduled on 12/26.    Continue Eliquis (12/4/2024 -->)    Time spent: 45 min.        Heber Daley MD PhD

## 2024-12-23 ENCOUNTER — TELEPHONE (OUTPATIENT)
Dept: HEMATOLOGY/ONCOLOGY | Facility: HOSPITAL | Age: 64
End: 2024-12-23
Payer: MEDICARE

## 2024-12-23 NOTE — TELEPHONE ENCOUNTER
Patient's wife calls today to see if Dr. Daley can call them back. They received a message from Dr. Daley on their voice mail after their appt last week. The connection was poor and they could not hear the message so they have no idea why Edi Foy was calling.    Message sent to team.

## 2024-12-26 ENCOUNTER — APPOINTMENT (OUTPATIENT)
Dept: RADIOLOGY | Facility: HOSPITAL | Age: 64
End: 2024-12-26
Payer: MEDICARE

## 2024-12-26 ENCOUNTER — APPOINTMENT (OUTPATIENT)
Dept: HEMATOLOGY/ONCOLOGY | Facility: HOSPITAL | Age: 64
End: 2024-12-26
Payer: MEDICARE

## 2024-12-26 ENCOUNTER — APPOINTMENT (OUTPATIENT)
Dept: HEMATOLOGY/ONCOLOGY | Facility: CLINIC | Age: 64
End: 2024-12-26
Payer: MEDICARE

## 2024-12-26 DIAGNOSIS — C83.38 DIFFUSE LARGE B-CELL LYMPHOMA OF LYMPH NODES OF MULTIPLE REGIONS (MULTI): ICD-10-CM

## 2024-12-28 ENCOUNTER — HOSPITAL ENCOUNTER (EMERGENCY)
Facility: HOSPITAL | Age: 64
Discharge: HOME | End: 2024-12-28
Payer: MEDICARE

## 2024-12-28 ENCOUNTER — APPOINTMENT (OUTPATIENT)
Dept: RADIOLOGY | Facility: HOSPITAL | Age: 64
End: 2024-12-28
Payer: MEDICARE

## 2024-12-28 VITALS
DIASTOLIC BLOOD PRESSURE: 84 MMHG | RESPIRATION RATE: 16 BRPM | HEIGHT: 73 IN | BODY MASS INDEX: 20.54 KG/M2 | HEART RATE: 66 BPM | WEIGHT: 155 LBS | SYSTOLIC BLOOD PRESSURE: 131 MMHG | TEMPERATURE: 97.2 F | OXYGEN SATURATION: 97 %

## 2024-12-28 DIAGNOSIS — R05.9 COUGH, UNSPECIFIED TYPE: Primary | ICD-10-CM

## 2024-12-28 LAB
ALBUMIN SERPL BCP-MCNC: 4 G/DL (ref 3.4–5)
ALP SERPL-CCNC: 72 U/L (ref 33–136)
ALT SERPL W P-5'-P-CCNC: 16 U/L (ref 10–52)
ANION GAP SERPL CALC-SCNC: 8 MMOL/L (ref 10–20)
AST SERPL W P-5'-P-CCNC: 20 U/L (ref 9–39)
BASOPHILS # BLD AUTO: 0.03 X10*3/UL (ref 0–0.1)
BASOPHILS NFR BLD AUTO: 0.5 %
BILIRUB SERPL-MCNC: 0.6 MG/DL (ref 0–1.2)
BUN SERPL-MCNC: 13 MG/DL (ref 6–23)
CALCIUM SERPL-MCNC: 9.2 MG/DL (ref 8.6–10.3)
CHLORIDE SERPL-SCNC: 105 MMOL/L (ref 98–107)
CO2 SERPL-SCNC: 29 MMOL/L (ref 21–32)
CREAT SERPL-MCNC: 1.11 MG/DL (ref 0.5–1.3)
EGFRCR SERPLBLD CKD-EPI 2021: 73 ML/MIN/1.73M*2
EOSINOPHIL # BLD AUTO: 0.01 X10*3/UL (ref 0–0.7)
EOSINOPHIL NFR BLD AUTO: 0.2 %
ERYTHROCYTE [DISTWIDTH] IN BLOOD BY AUTOMATED COUNT: 15.9 % (ref 11.5–14.5)
FLUAV RNA RESP QL NAA+PROBE: NOT DETECTED
FLUBV RNA RESP QL NAA+PROBE: NOT DETECTED
GLUCOSE SERPL-MCNC: 95 MG/DL (ref 74–99)
HCT VFR BLD AUTO: 32.2 % (ref 41–52)
HGB BLD-MCNC: 10.3 G/DL (ref 13.5–17.5)
IMM GRANULOCYTES # BLD AUTO: 0.03 X10*3/UL (ref 0–0.7)
IMM GRANULOCYTES NFR BLD AUTO: 0.5 % (ref 0–0.9)
LACTATE SERPL-SCNC: 0.9 MMOL/L (ref 0.4–2)
LYMPHOCYTES # BLD AUTO: 0.61 X10*3/UL (ref 1.2–4.8)
LYMPHOCYTES NFR BLD AUTO: 11.1 %
MCH RBC QN AUTO: 31.3 PG (ref 26–34)
MCHC RBC AUTO-ENTMCNC: 32 G/DL (ref 32–36)
MCV RBC AUTO: 98 FL (ref 80–100)
MONOCYTES # BLD AUTO: 0.41 X10*3/UL (ref 0.1–1)
MONOCYTES NFR BLD AUTO: 7.5 %
NEUTROPHILS # BLD AUTO: 4.39 X10*3/UL (ref 1.2–7.7)
NEUTROPHILS NFR BLD AUTO: 80.2 %
NRBC BLD-RTO: 0 /100 WBCS (ref 0–0)
PLATELET # BLD AUTO: 268 X10*3/UL (ref 150–450)
POTASSIUM SERPL-SCNC: 4.3 MMOL/L (ref 3.5–5.3)
PROT SERPL-MCNC: 6.7 G/DL (ref 6.4–8.2)
RBC # BLD AUTO: 3.29 X10*6/UL (ref 4.5–5.9)
RSV RNA RESP QL NAA+PROBE: NOT DETECTED
SARS-COV-2 RNA RESP QL NAA+PROBE: NOT DETECTED
SODIUM SERPL-SCNC: 138 MMOL/L (ref 136–145)
WBC # BLD AUTO: 5.5 X10*3/UL (ref 4.4–11.3)

## 2024-12-28 PROCEDURE — 83605 ASSAY OF LACTIC ACID: CPT | Performed by: PHYSICIAN ASSISTANT

## 2024-12-28 PROCEDURE — 85025 COMPLETE CBC W/AUTO DIFF WBC: CPT | Performed by: PHYSICIAN ASSISTANT

## 2024-12-28 PROCEDURE — 87637 SARSCOV2&INF A&B&RSV AMP PRB: CPT | Performed by: PHYSICIAN ASSISTANT

## 2024-12-28 PROCEDURE — 99284 EMERGENCY DEPT VISIT MOD MDM: CPT | Mod: 25

## 2024-12-28 PROCEDURE — 71046 X-RAY EXAM CHEST 2 VIEWS: CPT | Performed by: RADIOLOGY

## 2024-12-28 PROCEDURE — 84075 ASSAY ALKALINE PHOSPHATASE: CPT | Performed by: PHYSICIAN ASSISTANT

## 2024-12-28 PROCEDURE — 71046 X-RAY EXAM CHEST 2 VIEWS: CPT

## 2024-12-28 PROCEDURE — 36415 COLL VENOUS BLD VENIPUNCTURE: CPT | Performed by: PHYSICIAN ASSISTANT

## 2024-12-28 RX ORDER — DOXYCYCLINE 100 MG/1
100 CAPSULE ORAL 2 TIMES DAILY
Qty: 20 CAPSULE | Refills: 0 | Status: SHIPPED | OUTPATIENT
Start: 2024-12-28 | End: 2025-01-07

## 2024-12-28 ASSESSMENT — COLUMBIA-SUICIDE SEVERITY RATING SCALE - C-SSRS
1. IN THE PAST MONTH, HAVE YOU WISHED YOU WERE DEAD OR WISHED YOU COULD GO TO SLEEP AND NOT WAKE UP?: NO
2. HAVE YOU ACTUALLY HAD ANY THOUGHTS OF KILLING YOURSELF?: NO
6. HAVE YOU EVER DONE ANYTHING, STARTED TO DO ANYTHING, OR PREPARED TO DO ANYTHING TO END YOUR LIFE?: NO

## 2024-12-28 ASSESSMENT — PAIN SCALES - GENERAL: PAINLEVEL_OUTOF10: 0 - NO PAIN

## 2024-12-28 ASSESSMENT — PAIN - FUNCTIONAL ASSESSMENT: PAIN_FUNCTIONAL_ASSESSMENT: 0-10

## 2024-12-28 NOTE — ED PROVIDER NOTES
EMERGENCY MEDICINE EVALUATION NOTE    History of Present Illness     Chief Complaint:   Chief Complaint   Patient presents with    Cough     PT is concerned for pneumonia, Wife recently dx w/ pneumonia. PT now has a cough.        HPI: Edmund Matthews is a 64 y.o. male presents with a chief complaint of pneumonia concerns.  He reports that he has had upper respiratory symptoms that include a cough with a little fatigue.  Patient states that he has no shortness of breath or chest pain.  Patient reports that the cough is occasionally productive but mostly clear.  Patient reports that his wife was diagnosed pneumonia and is concerned eventually has caught something.  Patient reports that he is currently in remission for B-cell lymphoma but he does still receive transfusions as he just received his last 1.  He states that according to his oncologist that he has received in remission based on last PET scan.  Patient states that he is currently taking Eliquis for DVT in his right lower extremity but does reiterate that he is not experiencing any significance of breath or chest pain.  Patient states that he is concerned he potentially might need antibiotics for treatment.    Previous History   History reviewed. No pertinent past medical history.  Past Surgical History:   Procedure Laterality Date    OTHER SURGICAL HISTORY  01/03/2022    Cleft palate repair     Social History     Tobacco Use    Smoking status: Never    Smokeless tobacco: Never     No family history on file.  Allergies   Allergen Reactions    Penicillins Swelling     Facial swelling    Shellfish Derived Swelling    Sulfa (Sulfonamide Antibiotics) Swelling    Rituximab-Pvvr Other     Rigors and tickle in throat. Resolved with pausing medication and giving benadryl, pepcid, and solumedrol.      Current Outpatient Medications   Medication Instructions    doxycycline (VIBRAMYCIN) 100 mg, oral, 2 times daily, Take with at least 8 ounces (large glass) of water, do not  lie down for 30 minutes after    Eliquis 5 mg, oral, 2 times daily    OLANZapine (ZYPREXA) 5 mg, oral, Nightly, For 4 days starting the evening of treatment.    ondansetron (ZOFRAN) 8 mg, oral, Every 8 hours PRN    predniSONE (Deltasone) 50 mg tablet Take 100 mg by mouth on Days 1, 2, 3, 4, and 5 of treatment cycle.    prochlorperazine (COMPAZINE) 10 mg, oral, Every 6 hours PRN       Physical Exam     Appearance: Alert, oriented , cooperative,  in no acute distress.      Skin: Intact,  dry skin, no lesions, rash, petechiae or purpura.      Eyes: PERRLA, EOMs intact,  Conjunctiva pink      ENT: Hearing grossly intact. Pharynx clear, uvula midline.      Neck: Supple. Trachea at midline.      Pulmonary: Clear bilaterally. No rales, rhonchi or wheezing. No accessory muscle use or stridor.  Nonproductive cough on examination.     Cardiac: Normal rate and rhythm without murmur     Abdomen: Soft, nontender, active bowel sounds.     Musculoskeletal: Full range of motion.      Neurological:Cranial nerves II through XII are grossly intact, normal sensation, no weakness, no focal findings identified.     Results     Labs Reviewed   CBC WITH AUTO DIFFERENTIAL - Abnormal       Result Value    WBC 5.5      nRBC 0.0      RBC 3.29 (*)     Hemoglobin 10.3 (*)     Hematocrit 32.2 (*)     MCV 98      MCH 31.3      MCHC 32.0      RDW 15.9 (*)     Platelets 268      Neutrophils % 80.2      Immature Granulocytes %, Automated 0.5      Lymphocytes % 11.1      Monocytes % 7.5      Eosinophils % 0.2      Basophils % 0.5      Neutrophils Absolute 4.39      Immature Granulocytes Absolute, Automated 0.03      Lymphocytes Absolute 0.61 (*)     Monocytes Absolute 0.41      Eosinophils Absolute 0.01      Basophils Absolute 0.03     COMPREHENSIVE METABOLIC PANEL - Abnormal    Glucose 95      Sodium 138      Potassium 4.3      Chloride 105      Bicarbonate 29      Anion Gap 8 (*)     Urea Nitrogen 13      Creatinine 1.11      eGFR 73      Calcium  9.2      Albumin 4.0      Alkaline Phosphatase 72      Total Protein 6.7      AST 20      Bilirubin, Total 0.6      ALT 16     LACTATE - Normal    Lactate 0.9      Narrative:     Venipuncture immediately after or during the administration of Metamizole may lead to falsely low results. Testing should be performed immediately prior to Metamizole dosing.   INFLUENZA A AND B PCR - Normal    Flu A Result Not Detected      Flu B Result Not Detected      Narrative:     This assay is an in vitro diagnostic multiplex nucleic acid amplification test for the detection and discrimination of Influenza A & B from nasopharyngeal specimens, and has been validated for use at [facility]. Negative results do not preclude Influenza A/B infections, and should not be used as the sole basis for diagnosis, treatment, or other management decisions. If Influenza A/B and RSV PCR results are negative, testing for Parainfluenza virus, Adenovirus and Metapneumovirus is routinely performed for [facility] pediatric oncology and intensive care inpatients, and is available on other patients by placing an add-on request.   SARS-COV-2 PCR - Normal    Coronavirus 2019, PCR Not Detected      Narrative:     This assay has received FDA Emergency Use Authorization (EUA) and is only authorized for the duration of time that circumstances exist to justify the authorization of the emergency use of in vitro diagnostic tests for the detection of SARS-CoV-2 virus and/or diagnosis of COVID-19 infection under section 564(b)(1) of the Act, 21 U.S.C. 360bbb-3(b)(1). This assay is an in vitro diagnostic nucleic acid amplification test for the qualitative detection of SARS-CoV-2 from nasopharyngeal specimens and has been validated for use at [facility]. Negative results do not preclude COVID-19 infections and should not be used as the sole basis for diagnosis, treatment, or other management decisions.     RSV PCR - Normal    RSV  "PCR Not Detected      Narrative:     This assay is an FDA-cleared, in vitro diagnostic nucleic acid amplification test for the detection of RSV from nasopharyngeal specimens, and has been validated for use at [facility]. Negative results do not preclude RSV infections, and should not be used as the sole basis for diagnosis, treatment, or other management decisions. If Influenza A/B and RSV PCR results are negative, testing for Parainfluenza virus, Adenovirus and Metapneumovirus is routinely performed for pediatric oncology and intensive care inpatients at [facility], and is available on other patients by placing an add-on request.         XR chest 2 views   Final Result   No acute cardiopulmonary disease.        MACRO:   none        Signed by: Lilo Mazariegos 12/28/2024 10:51 AM   Dictation workstation:   [id number]            ED Course & Medical Decision Making   Medications - No data to display  Heart Rate:  [66-78]   Temperature:  [36.2 °C (97.2 °F)-36.3 °C (97.3 °F)]   Respirations:  [16]   BP: (123-131)/(80-84)   Height:  [185.4 cm (6' 1\")]   Weight:  [70.3 kg (155 lb)]   Pulse Ox:  [97 %-99 %]    ED Course as of 12/28/24 1123   Sat Dec 28, 2024   1122 Reevaluated the patient at this time.  Updated him on the workup.  Patient's workup did not show any specific abnormalities today.  Specifically patient's RSV, COVID, and flu were negative.  Patient does not have a leukocytosis he also does not have leukopenia from recent transfusions.  Patient CMP is within normal limits lactate is negative chest x-ray did not show any acute consolidations.  I updated him since he is technically immunocompromised we should treat him with antibiotics.  Patient will be given a doxycycline prescription for home.  Patient encouraged to follow-up with primary care provider in 1 to 2 days or return here immediately with any worsening symptoms. [CJ]      ED Course User Index  [CJ] Norris King PA-C         Diagnoses as " of 12/28/24 1123   Cough, unspecified type       Procedures   Procedures    Diagnosis     1. Cough, unspecified type        Disposition   Discharge    ED Prescriptions       Medication Sig Dispense Start Date End Date Auth. Provider    doxycycline (Vibramycin) 100 mg capsule Take 1 capsule (100 mg) by mouth 2 times a day for 10 days. Take with at least 8 ounces (large glass) of water, do not lie down for 30 minutes after 20 capsule 12/28/2024 1/7/2025 Norris King PA-C            Disclaimer: This note was dictated by speech recognition. Minor errors in transcription may be present. Please call if questions.       Norris King PA-C  12/28/24 1123

## 2024-12-30 ENCOUNTER — INFUSION (OUTPATIENT)
Dept: HEMATOLOGY/ONCOLOGY | Facility: CLINIC | Age: 64
End: 2024-12-30
Payer: MEDICARE

## 2024-12-30 VITALS
HEIGHT: 72 IN | BODY MASS INDEX: 22.26 KG/M2 | TEMPERATURE: 95.4 F | HEART RATE: 66 BPM | DIASTOLIC BLOOD PRESSURE: 77 MMHG | SYSTOLIC BLOOD PRESSURE: 127 MMHG | OXYGEN SATURATION: 100 % | RESPIRATION RATE: 16 BRPM | WEIGHT: 164.3 LBS

## 2024-12-30 DIAGNOSIS — C83.38 DIFFUSE LARGE B-CELL LYMPHOMA OF LYMPH NODES OF MULTIPLE REGIONS (MULTI): ICD-10-CM

## 2024-12-30 PROCEDURE — 96375 TX/PRO/DX INJ NEW DRUG ADDON: CPT | Mod: INF

## 2024-12-30 PROCEDURE — 96413 CHEMO IV INFUSION 1 HR: CPT

## 2024-12-30 PROCEDURE — 96377 APPLICATON ON-BODY INJECTOR: CPT | Mod: 59

## 2024-12-30 PROCEDURE — 2500000001 HC RX 250 WO HCPCS SELF ADMINISTERED DRUGS (ALT 637 FOR MEDICARE OP): Performed by: INTERNAL MEDICINE

## 2024-12-30 PROCEDURE — 2500000004 HC RX 250 GENERAL PHARMACY W/ HCPCS (ALT 636 FOR OP/ED): Performed by: INTERNAL MEDICINE

## 2024-12-30 PROCEDURE — 96367 TX/PROPH/DG ADDL SEQ IV INF: CPT

## 2024-12-30 PROCEDURE — 96415 CHEMO IV INFUSION ADDL HR: CPT

## 2024-12-30 PROCEDURE — 96417 CHEMO IV INFUS EACH ADDL SEQ: CPT

## 2024-12-30 PROCEDURE — 96411 CHEMO IV PUSH ADDL DRUG: CPT

## 2024-12-30 RX ORDER — FAMOTIDINE 10 MG/ML
20 INJECTION INTRAVENOUS ONCE AS NEEDED
Status: DISCONTINUED | OUTPATIENT
Start: 2024-12-30 | End: 2024-12-30 | Stop reason: HOSPADM

## 2024-12-30 RX ORDER — ALBUTEROL SULFATE 0.83 MG/ML
3 SOLUTION RESPIRATORY (INHALATION) AS NEEDED
Status: DISCONTINUED | OUTPATIENT
Start: 2024-12-30 | End: 2024-12-30 | Stop reason: HOSPADM

## 2024-12-30 RX ORDER — PROCHLORPERAZINE MALEATE 10 MG
10 TABLET ORAL EVERY 6 HOURS PRN
Status: DISCONTINUED | OUTPATIENT
Start: 2024-12-30 | End: 2024-12-30 | Stop reason: HOSPADM

## 2024-12-30 RX ORDER — EPINEPHRINE 0.3 MG/.3ML
0.3 INJECTION SUBCUTANEOUS EVERY 5 MIN PRN
Status: DISCONTINUED | OUTPATIENT
Start: 2024-12-30 | End: 2024-12-30 | Stop reason: HOSPADM

## 2024-12-30 RX ORDER — ACETAMINOPHEN 325 MG/1
650 TABLET ORAL ONCE
Status: COMPLETED | OUTPATIENT
Start: 2024-12-30 | End: 2024-12-30

## 2024-12-30 RX ORDER — DOXORUBICIN HYDROCHLORIDE 2 MG/ML
50 INJECTION, SOLUTION INTRAVENOUS ONCE
Status: COMPLETED | OUTPATIENT
Start: 2024-12-30 | End: 2024-12-30

## 2024-12-30 RX ORDER — DIPHENHYDRAMINE HYDROCHLORIDE 50 MG/ML
50 INJECTION INTRAMUSCULAR; INTRAVENOUS AS NEEDED
Status: DISCONTINUED | OUTPATIENT
Start: 2024-12-30 | End: 2024-12-30 | Stop reason: HOSPADM

## 2024-12-30 RX ORDER — DIPHENHYDRAMINE HCL 25 MG
50 CAPSULE ORAL ONCE
Status: COMPLETED | OUTPATIENT
Start: 2024-12-30 | End: 2024-12-30

## 2024-12-30 RX ORDER — HEPARIN 100 UNIT/ML
500 SYRINGE INTRAVENOUS AS NEEDED
OUTPATIENT
Start: 2024-12-30

## 2024-12-30 RX ORDER — PROCHLORPERAZINE EDISYLATE 5 MG/ML
10 INJECTION INTRAMUSCULAR; INTRAVENOUS EVERY 6 HOURS PRN
Status: DISCONTINUED | OUTPATIENT
Start: 2024-12-30 | End: 2024-12-30 | Stop reason: HOSPADM

## 2024-12-30 RX ORDER — PALONOSETRON 0.05 MG/ML
0.25 INJECTION, SOLUTION INTRAVENOUS ONCE
Status: COMPLETED | OUTPATIENT
Start: 2024-12-30 | End: 2024-12-30

## 2024-12-30 RX ORDER — HEPARIN SODIUM,PORCINE/PF 10 UNIT/ML
50 SYRINGE (ML) INTRAVENOUS AS NEEDED
OUTPATIENT
Start: 2024-12-30

## 2024-12-30 RX ADMIN — ACETAMINOPHEN 650 MG: 325 TABLET ORAL at 12:01

## 2024-12-30 RX ADMIN — CYCLOPHOSPHAMIDE 1500 MG: 1 INJECTION, POWDER, FOR SOLUTION INTRAVENOUS; ORAL at 11:53

## 2024-12-30 RX ADMIN — SODIUM CHLORIDE 746 MG: 9 INJECTION, SOLUTION INTRAVENOUS at 12:44

## 2024-12-30 RX ADMIN — DOXORUBICIN HYDROCHLORIDE 100 MG: 2 INJECTION, SOLUTION INTRAVENOUS at 10:54

## 2024-12-30 RX ADMIN — VINCRISTINE SULFATE 2 MG: 1 INJECTION, SOLUTION INTRAVENOUS at 11:28

## 2024-12-30 RX ADMIN — PALONOSETRON HYDROCHLORIDE 250 MCG: 0.25 INJECTION INTRAVENOUS at 10:13

## 2024-12-30 RX ADMIN — FOSAPREPITANT 150 MG: 150 INJECTION, POWDER, LYOPHILIZED, FOR SOLUTION INTRAVENOUS at 10:13

## 2024-12-30 RX ADMIN — PEGFILGRASTIM 6 MG: KIT SUBCUTANEOUS at 15:15

## 2024-12-30 RX ADMIN — DIPHENHYDRAMINE HYDROCHLORIDE 50 MG: 25 CAPSULE ORAL at 12:01

## 2024-12-30 ASSESSMENT — PAIN SCALES - GENERAL: PAINLEVEL_OUTOF10: 0-NO PAIN

## 2024-12-30 NOTE — PROGRESS NOTES
Rituximab Rates:  -100mg/[id number]= rate: 45.87 ml/hr, VTBI= 22.9 mls  -200mg/[id number]= rate: 91.7 mls/hr, VTBI= 45.9 mls  -300 mg/[id number]= rate: 137.6mls/hr, VTBI= 68.8mls  -400 mg/hr till complete= rate: 183.5 ml/hr, VTBI: 204 mls

## 2024-12-30 NOTE — SIGNIFICANT EVENT
12/30/24 0944   Prechemo Checklist   Has the patient been in the hospital, ED, or urgent care since last date of service Yes  (Provider Bobby Adamson, covering Dr for Dr Daley, verbalized ok to treat)   Chemo/Immuno Consent Completed and Signed Yes   Protocol/Indications Verified Yes   Confirmed to previous date/time of medication Yes   Compared to previous dose Yes   All medications are dated accurately Yes   Pregnancy Test Negative Not applicable   Parameters Met Yes   BSA/Weight-Height Verified Yes   Dose Calculations Verified (current, total, cumulative) Yes

## 2024-12-30 NOTE — PROGRESS NOTES
Pt arrived awake, alert, oriented, no apparent distress with unlabored breaths. Pt with noted ER visit on 12/28/24 for cough, in which he was discharged with a Rx for doxycycline, in which he reports that he has been taking as prescribed. Pt denies N/V/D/fevers and states that he is feeling fine today. Dr. Delcid (covering for Dr. Daley) notified, with a verbalized ok to treat today for day 1, cycle 6. Pt received treatment and tolerated well. Today is pt last treatment in his plans, with a FUV set for 1/9/25. Pt without further questions or concerns, discharged in stable condition

## 2025-01-02 ENCOUNTER — TELEPHONE (OUTPATIENT)
Dept: HEMATOLOGY/ONCOLOGY | Facility: HOSPITAL | Age: 65
End: 2025-01-02
Payer: MEDICARE

## 2025-01-09 ENCOUNTER — TELEPHONE (OUTPATIENT)
Dept: HEMATOLOGY/ONCOLOGY | Facility: HOSPITAL | Age: 65
End: 2025-01-09
Payer: MEDICARE

## 2025-01-09 ENCOUNTER — OFFICE VISIT (OUTPATIENT)
Dept: HEMATOLOGY/ONCOLOGY | Facility: HOSPITAL | Age: 65
End: 2025-01-09
Payer: MEDICARE

## 2025-01-09 VITALS
BODY MASS INDEX: 22.31 KG/M2 | OXYGEN SATURATION: 94 % | WEIGHT: 166 LBS | SYSTOLIC BLOOD PRESSURE: 100 MMHG | RESPIRATION RATE: 16 BRPM | HEART RATE: 62 BPM | TEMPERATURE: 96.4 F | DIASTOLIC BLOOD PRESSURE: 61 MMHG

## 2025-01-09 DIAGNOSIS — C83.38 DIFFUSE LARGE B-CELL LYMPHOMA OF LYMPH NODES OF MULTIPLE REGIONS (MULTI): ICD-10-CM

## 2025-01-09 PROCEDURE — 1126F AMNT PAIN NOTED NONE PRSNT: CPT | Performed by: INTERNAL MEDICINE

## 2025-01-09 PROCEDURE — 99214 OFFICE O/P EST MOD 30 MIN: CPT | Performed by: INTERNAL MEDICINE

## 2025-01-09 PROCEDURE — 1157F ADVNC CARE PLAN IN RCRD: CPT | Performed by: INTERNAL MEDICINE

## 2025-01-09 ASSESSMENT — PAIN SCALES - GENERAL: PAINLEVEL_OUTOF10: 0-NO PAIN

## 2025-01-09 ASSESSMENT — ENCOUNTER SYMPTOMS
PSYCHIATRIC NEGATIVE: 1
ENDOCRINE NEGATIVE: 1
NEUROLOGICAL NEGATIVE: 1
CARDIOVASCULAR NEGATIVE: 1
FATIGUE: 1
EYES NEGATIVE: 1
HEMATOLOGIC/LYMPHATIC NEGATIVE: 1
GASTROINTESTINAL NEGATIVE: 1
MUSCULOSKELETAL NEGATIVE: 1

## 2025-01-09 NOTE — PROGRESS NOTES
Patient ID: Edmund Matthews is a 64 y.o. male.  Referring Physician: Heber Daley MD PhD  [id number] [place] [address]  Primary Care Provider: Mp Bee MD      Subjective    The patient has been in excellent health, but was recently diagnosed with early onset Alzheimer's. He is not on treatments, but chose to retire early. On 7/3/2024 he noted swelling on the left side of the nose and cheek bone. No pain. This was quickly worked up, including a visit with Dr. Mitchell of [facility], and biopsy on 8/9/2024, showing   A: MAXILLA, BIOPSY:  --  LIMITED SAMPLE WITH EXTENSIVE CRUSH ARTIFACT; FINDINGS CONCERNING FOR LARGE B-CELL LYMPHOMA, FINAL CLASSIFICATION PENDING GENETIC STUDIES.  SUBTYPE BY BUD CRITERIA: Germinal center type (CD10-, BCL6+, MUM1-).   MYC/BCL2 EXPRESSOR PHENOTYPE: Not a double expressor (BCL2+, C-MYC-).    9/26/2024: he tolerated first dose of RCHOP on 9/11. Today he feels well, but does have mild dry cough. Feels mild tingling sensation in the left face. No fever wt loss or night sweats. No pain or headache. No change in vision or swallowing. Memory deficit is mild and stable. He then received C2-3 on 10/2 and 10/23, respectively.     11/7: today he says he was very tired for 1 week after each chemo. He also developed a dry cough last week, but has no fever. The symptom is improving.     12/4: new edema on the left leg. Doppler confirmed DVT. Eliquis started.     12/19: feels more tired. No other complaints. No cough or fever.           Review of Systems   Constitutional:  Positive for fatigue.   Eyes: Negative.    Cardiovascular: Negative.    Gastrointestinal: Negative.    Endocrine: Negative.    Genitourinary: Negative.     Musculoskeletal: Negative.    Skin: Negative.    Neurological: Negative.    Hematological: Negative.    Psychiatric/Behavioral: Negative.          Objective   BSA: 1.96 meters squared  /61 (BP Location: Left arm, Patient Position: Sitting, BP Cuff Size: Large  adult)   Pulse 62   Temp 35.8 °C (96.4 °F) (Skin)   Resp 16   Wt 75.3 kg (166 lb)   SpO2 94%   BMI 22.31 kg/m²     No family history on file.  Oncology History   Diffuse large B-cell lymphoma of lymph nodes of multiple regions (Multi)   8/30/2024 Initial Diagnosis    Diffuse large B-cell lymphoma of lymph nodes of multiple regions (Multi)    The patient had been in excellent health, but was recently diagnosed with early onset Alzheimer's. He hose to retire early.     On 7/3/2024 he noted swelling on the left side of the nose and cheek bone. No pain. This was quickly worked up, including a visit with Dr. Mitchell of [facility], and biopsy on 8/9/2024, showing:     A: MAXILLA, BIOPSY:  --  LIMITED SAMPLE WITH EXTENSIVE CRUSH ARTIFACT; FINDINGS CONCERNING FOR LARGE B-CELL LYMPHOMA, FINAL CLASSIFICATION PENDING GENETIC STUDIES.  SUBTYPE BY BUD CRITERIA: Germinal center type (CD10-, BCL6+, MUM1-).   MYC/BCL2 EXPRESSOR PHENOTYPE: Not a double expressor (BCL2+, C-MYC-).    -Stage IV due to extranodal involvement (sinus, bone, premasillary soft tissue, orbit, nose) as shown below in the PET of 8/29:  Intensely hypermetabolic mass centered at the left maxilla causing osseous erosion and extending  into the left pre maxillary soft tissues, with involvement of the inferior margin of the left orbit and left lateral nose, as well as the left maxillary sinus with SUV max of 8.4.  -Tumor burden is relatively low, but location is high risk.  -Will need to assess orbits.   -For stage IV DLBCL, will need systemic chemo. R-CHOP is considered a standard. Furthermore, the MARTINEZ 3 is also a reasonable or preferred option, as it randomize RCHOP vs RCHOP + Odronextamab. He is interested in the study. Research staff is on site to help and will follow up on eligibility.   -9/5: due to existing Alzheimer's disease, he is not eligible for the above study. Will pursue the SOC, I.e. RCHOP. The pre-planned treatment is 6 cycles. Will need to  complete work up.   -9/26: MRI of the orbits show disease limited to the facial structure but does not show orbital or optic nerve involvement. ECHO was WNL. Will therefore continue [facility]     9/11/2024 -  Chemotherapy    R-CHOP (Cyclophosphamide / DOXOrubicin / VinCRIStine / PredniSONE) + RiTUXimab, 21 Day Cycles         Edmund Matthews  reports that he has never smoked. He has never used smokeless tobacco.  He  has no history on file for alcohol use.  He  has no history on file for drug use.    Physical Exam  Constitutional:       General: He is not in acute distress.     Appearance: He is not toxic-appearing.   HENT:      Head: Normocephalic.      Nose: Nose normal.      Comments: A mass without clear border is appreciated between the left side of nose and the cheekbone. Non tender.   -9/26/2024: the mass is less prominent. However, it is not measurable.   -11/7: no mass.      Mouth/Throat:      Mouth: Mucous membranes are moist.   Eyes:      Extraocular Movements: Extraocular movements intact.      Pupils: Pupils are equal, round, and reactive to light.   Cardiovascular:      Rate and Rhythm: Normal rate and regular rhythm.      Heart sounds: No murmur heard.  Pulmonary:      Effort: Pulmonary effort is normal.      Breath sounds: Normal breath sounds.   Abdominal:      General: Bowel sounds are normal.      Palpations: Abdomen is soft. There is no mass.      Tenderness: There is no abdominal tenderness. There is no rebound.   Musculoskeletal:         General: No swelling, tenderness, deformity or signs of injury.      Right lower leg: No edema.      Left lower leg: No edema.   Skin:     Coloration: Skin is not jaundiced.      Findings: No bruising, lesion or rash.   Neurological:      Mental Status: He is alert and oriented to person, place, and time.      Cranial Nerves: No cranial nerve deficit.      Motor: No weakness.      Gait: Gait normal.   Psychiatric:         Mood and Affect: Mood normal.          Performance Status:  ECOG PS =1.    Assessment/Plan      A/P  ##DLBCL, newly dx, 8/9/2024:  - A: MAXILLA, BIOPSY:  --  LIMITED SAMPLE WITH EXTENSIVE CRUSH ARTIFACT; FINDINGS CONCERNING FOR LARGE B-CELL LYMPHOMA, FINAL CLASSIFICATION PENDING GENETIC STUDIES.  SUBTYPE BY BUD CRITERIA: Germinal center type (CD10-, BCL6+, MUM1-).   MYC/BCL2 EXPRESSOR PHENOTYPE: Not a double expressor (BCL2+, C-MYC-).  -Stage IV due to extranodal involvement (sinus, bone, premasillary soft tissue, orbit, nose) as shown below in the PET of 8/29:  Intensely hypermetabolic mass centered at the left maxilla causing osseous erosion and extending  into the left pre maxillary soft tissues, with involvement of the inferior margin of the left orbit and left lateral nose, as well as the left maxillary sinus with SUV max of 8.4.  -Tumor burden is relatively low, but location is high risk.  -Will need to assess orbits.   -For stage IV DLBCL, will need systemic chemo. R-CHOP is considered a standard. Furthermore, the MARTINEZ 3 is also a reasonable or preferred option, as it randomize RCHOP vs RCHOP + Odronextamab. He is interested in the study. Research staff is on site to help and will follow up on eligibility.   -9/5: due to existing Alzheimer's disease, he is not eligible for the above study. Will pursue the SOC, I.e. RCHOP. The pre-planned treatment is 6 cycles. Will need to complete work up.   -9/26: MRI of the orbits show disease limited to the facial structure but does not show orbital or optic nerve involvement. ECHO was WNL. Will therefore continue RCHOP, c2 on 10/2, then 10/23.   -11/7: Finished 3 cycles of RCHOP, and has mild - moderate fatigue for 1w. Otherwise doing well. Will continue same. Next cycles: 11/13, 12/4, 12/26.   -12/19: started c5 RCHOP on 12/4, and tolerated well, but still has fatigue. Unexpectedly he developed DVT on 12/4 in the left leg. Had PET/CT today, results pending. C6 RCHOP is tentatively scheduled on  12/26.      #DVT  -see above. On eliquis since 12/4, with improvement.    Plan 1/9:  PET/CT 2/10.   Continue Eliquis (12/4/2024 -->)  -RTC after PET/CT. In the future, q6mon for the first 2y.     Time spent: 45 min.        Heber Daley MD PhD

## 2025-01-09 NOTE — TELEPHONE ENCOUNTER
RN called to verify if patient was coming into 3pm visit with dr. Daley today. No answer, RN left message on wife's voicemail. Told patient to not call back if on the way, but if needed, let us know if needs rescheduled

## 2025-01-14 ENCOUNTER — NURSE TRIAGE (OUTPATIENT)
Dept: HEMATOLOGY/ONCOLOGY | Facility: HOSPITAL | Age: 65
End: 2025-01-14
Payer: MEDICARE

## 2025-01-14 NOTE — TELEPHONE ENCOUNTER
Per Dr. Daley, he would like for pt to be seen in [facility] clinic for evaluation.    Spoke to Claudine Kendrick [facility] Charge RN, they have a 3pm [facility]e appt available.    Called pt back. He accepted the appointment and is familiar with the location and check in process. Understanding verbalized to plan of care. No further needs at this time.    Infusion  to complete appointment scheduling for this visit.    Team updated.

## 2025-01-14 NOTE — TELEPHONE ENCOUNTER
Additional Information   Commented on: What else do you want to tell me about this problem?     Edmund calls today to state that he developed a hard lump on the inside of his right elbow about 4-5 days ago. The lump is hard, non-painful and appproximately the size of 1/3 of a golf ball.    Denies fever/chills, night sweats, unintended weight loss, dizziness, SOB, cough, chest pain or any other worrisome symptoms.    States that he was diagnosed with a DVT in his left leg several weeks ago and is on Eliquis.    He has a PET/CT on 2/6 and FUV with Dr. Daley on 2/13.    Protocols used: Other    Pt completed R-CHOP on 12/30.     No recent illnesses, sick contacts or trauma to arm.    Message sent to team to see if they want to move up appointments/bring pt in for evaluation of the non-painful lump.

## 2025-01-15 ENCOUNTER — INFUSION (OUTPATIENT)
Dept: HEMATOLOGY/ONCOLOGY | Facility: HOSPITAL | Age: 65
End: 2025-01-15
Payer: MEDICARE

## 2025-01-15 ENCOUNTER — OFFICE VISIT (OUTPATIENT)
Dept: HEMATOLOGY/ONCOLOGY | Facility: HOSPITAL | Age: 65
End: 2025-01-15
Payer: MEDICARE

## 2025-01-15 VITALS
HEIGHT: 72 IN | DIASTOLIC BLOOD PRESSURE: 61 MMHG | SYSTOLIC BLOOD PRESSURE: 122 MMHG | BODY MASS INDEX: 22.19 KG/M2 | WEIGHT: 163.8 LBS | HEART RATE: 55 BPM | TEMPERATURE: 98.2 F | OXYGEN SATURATION: 98 % | RESPIRATION RATE: 18 BRPM

## 2025-01-15 DIAGNOSIS — C83.38 DIFFUSE LARGE B-CELL LYMPHOMA OF LYMPH NODES OF MULTIPLE REGIONS (MULTI): Primary | ICD-10-CM

## 2025-01-15 DIAGNOSIS — C83.38 DIFFUSE LARGE B-CELL LYMPHOMA OF LYMPH NODES OF MULTIPLE REGIONS (MULTI): ICD-10-CM

## 2025-01-15 DIAGNOSIS — M79.89 NODULE OF SOFT TISSUE: ICD-10-CM

## 2025-01-15 PROCEDURE — 1157F ADVNC CARE PLAN IN RCRD: CPT | Performed by: PHYSICIAN ASSISTANT

## 2025-01-15 PROCEDURE — 99215 OFFICE O/P EST HI 40 MIN: CPT | Performed by: PHYSICIAN ASSISTANT

## 2025-01-15 PROCEDURE — 99211 OFF/OP EST MAY X REQ PHY/QHP: CPT

## 2025-01-15 ASSESSMENT — ENCOUNTER SYMPTOMS
FEVER: 0
UNEXPECTED WEIGHT CHANGE: 0
EYE PROBLEMS: 0
EXTREMITY WEAKNESS: 1
WHEEZING: 0
COUGH: 0
HEADACHES: 0
DIARRHEA: 0
NAUSEA: 0
LEG SWELLING: 0
HEMATURIA: 0
ABDOMINAL PAIN: 0
DYSURIA: 0
DIZZINESS: 0
VOMITING: 0
SHORTNESS OF BREATH: 0
PALPITATIONS: 0
NUMBNESS: 0
BLOOD IN STOOL: 0
FREQUENCY: 0
HEMOPTYSIS: 0
CONSTIPATION: 0

## 2025-01-15 NOTE — PROGRESS NOTES
Patient ID: Edmund Matthews is a 64 y.o. male.    Diagnosis: No matching staging information was found for the patient.,    Treatment:   Oncology History   Diffuse large B-cell lymphoma of lymph nodes of multiple regions (Multi)   8/30/2024 Initial Diagnosis    Diffuse large B-cell lymphoma of lymph nodes of multiple regions (Multi)    The patient had been in excellent health, but was recently diagnosed with early onset Alzheimer's. He hose to retire early.     On 7/3/2024 he noted swelling on the left side of the nose and cheek bone. No pain. This was quickly worked up, including a visit with Dr. Mitchell of [facility], and biopsy on 8/9/2024, showing:     A: MAXILLA, BIOPSY:  --  LIMITED SAMPLE WITH EXTENSIVE CRUSH ARTIFACT; FINDINGS CONCERNING FOR LARGE B-CELL LYMPHOMA, FINAL CLASSIFICATION PENDING GENETIC STUDIES.  SUBTYPE BY BUD CRITERIA: Germinal center type (CD10-, BCL6+, MUM1-).   MYC/BCL2 EXPRESSOR PHENOTYPE: Not a double expressor (BCL2+, C-MYC-).    -Stage IV due to extranodal involvement (sinus, bone, premasillary soft tissue, orbit, nose) as shown below in the PET of 8/29:  Intensely hypermetabolic mass centered at the left maxilla causing osseous erosion and extending  into the left pre maxillary soft tissues, with involvement of the inferior margin of the left orbit and left lateral nose, as well as the left maxillary sinus with SUV max of 8.4.  -Tumor burden is relatively low, but location is high risk.  -Will need to assess orbits.   -For stage IV DLBCL, will need systemic chemo. R-CHOP is considered a standard. Furthermore, the MARTINEZ 3 is also a reasonable or preferred option, as it randomize RCHOP vs RCHOP + Odronextamab. He is interested in the study. Research staff is on site to help and will follow up on eligibility.   -9/5: due to existing Alzheimer's disease, he is not eligible for the above study. Will pursue the SOC, I.e. RCHOP. The pre-planned treatment is 6 cycles. Will need to complete work up.    -9/26: MRI of the orbits show disease limited to the facial structure but does not show orbital or optic nerve involvement. ECHO was WNL. Will therefore continue [facility]     9/11/2024 -  Chemotherapy    R-CHOP (Cyclophosphamide / DOXOrubicin / VinCRIStine / PredniSONE) + RiTUXimab, 21 Day Cycles         Past Medical History:   No past medical history on file.    Surgical History:     Past Surgical History:   Procedure Laterality Date    OTHER SURGICAL HISTORY  01/03/2022    Cleft palate repair        Family History:   No family history on file.    Social History:     Social History     Tobacco Use    Smoking status: Never    Smokeless tobacco: Never      -------------------------------------------------------------------------------------------------------  Subjective       The patient presents to the clinic today (01/15/25) for a sick visit due to new R antecubital nodule, accompanied by his wife.     He says he's developed a hard lump or mass on the inside of his elbow in the last 4-5 days. He's fairly convinced that it wasn't there before. It's essentially stayed the same in size those 4-5 days. He says that it doesn't hurt. It's a little red, he says, but not angry. Denies any other rashes, lumps or bumps.     He says he has no further swelling in his face or nose, nor any symptoms there. At the time of his initial presentation, he was was not having night sweats or weight loss, and he is not at this time.     Denies fevers and night sweats. He says his appetite is good. He denies nausea/vomiting/diarrhea. Denies constipation. Denies chest pain, dyspnea, cough, and productive cough.     His wife does note that his legs are shakier over the last few months,  since he's gotten more cycles of CHOP -- last treatment was on 12/30/25.     (Of note, he did not see Dr. Daley on 1/9/25).     Review of Systems   Constitutional:  Negative for fever and unexpected weight change.   HENT:   Negative for mouth sores.    Eyes:   Negative for eye problems.   Respiratory:  Negative for cough, hemoptysis, shortness of breath and wheezing.    Cardiovascular:  Negative for chest pain, leg swelling and palpitations.   Gastrointestinal:  Negative for abdominal pain, blood in stool, constipation, diarrhea, nausea and vomiting.   Genitourinary:  Negative for bladder incontinence, dysuria, frequency and hematuria.    Musculoskeletal:  Negative for gait problem.   Skin:  Negative for rash.   Neurological:  Positive for extremity weakness (Symmetrical). Negative for dizziness, gait problem, headaches and numbness.   All other systems reviewed and are negative.     -------------------------------------------------------------------------------------------------------  Objective   BSA: There is no height or weight on file to calculate BSA.  There were no vitals taken for this visit.    Physical Exam  HENT:      Mouth/Throat:      Mouth: Mucous membranes are moist.      Pharynx: No posterior oropharyngeal erythema.   Eyes:      General: No scleral icterus.     Extraocular Movements: Extraocular movements intact.      Pupils: Pupils are equal, round, and reactive to light.   Cardiovascular:      Rate and Rhythm: Normal rate and regular rhythm.      Heart sounds: No murmur heard.     No friction rub. No gallop.   Pulmonary:      Effort: No respiratory distress.      Breath sounds: No stridor. No wheezing, rhonchi or rales.   Abdominal:      General: There is no distension.      Palpations: There is no mass.      Tenderness: There is no abdominal tenderness. There is no guarding or rebound.   Musculoskeletal:         General: No swelling or deformity.      Left upper arm: Normal.        Arms:       Right lower leg: No edema.      Left lower leg: No edema.   Lymphadenopathy:      Cervical: No cervical adenopathy.   Skin:     Findings: No lesion or rash.   Neurological:      Mental Status: He is alert.      Cranial Nerves: No cranial nerve deficit.      Motor:  No weakness.   Psychiatric:         Mood and Affect: Mood normal.         Behavior: Behavior normal.       Performance Status:  Symptomatic; fully ambulatory  -------------------------------------------------------------------------------------------------------  Assessment/Plan    Assessment & Plan  Diffuse large B-cell lymphoma of lymph nodes of multiple regions (Multi)  - Workup and treatment as above (Onc History)  - Completed C6 R-CHOP 12/30/24  - End-of-treatment PET-CT scheduled for 2/6/25    Nodule of soft tissue  - Per patient, new since ~ 1/10/25  - No signs or symptoms of infection: no real erythema, fever, tenderness, heat, or fluctuance  - On balance, feels most consistent with a vascular change -- relatively hard and mobile, tracks proximally from a palpable distal vein  - Will order RUE MSK/soft-tissue ultrasound to be completed at [place] to assess this nodule: vascular vs fluid vs any concern for lymphoma  - If tissue density is concerning for NHL, could move up disease-response assessment.      RTC:  - Ultrasound of RUE soft-tissue nodule -- requested for [facility], pending  - 2/6/25: End-of-treatment PET-CT  - 2/13/25: Dr. Daley  -------------------------------------------------------------------------------------------------------  Abdullahi Armijo PA-C  [facility]

## 2025-01-15 NOTE — ASSESSMENT & PLAN NOTE
- Workup and treatment as above (Onc History)  - Completed C6 R-CHOP 12/30/24  - End-of-treatment PET-CT scheduled for 2/6/25

## 2025-01-15 NOTE — ASSESSMENT & PLAN NOTE
- Per patient, new since ~ 1/10/25  - No signs or symptoms of infection: no real erythema, fever, tenderness, heat, or fluctuance  - On balance, feels most consistent with a vascular change -- relatively hard and mobile, tracks proximally from a palpable distal vein  - Will order RUE MSK/soft-tissue ultrasound to be completed at [place] to assess this nodule: vascular vs fluid vs any concern for lymphoma  - If tissue density is concerning for NHL, could move up disease-response assessment.

## 2025-01-15 NOTE — PROGRESS NOTES
-Patient presents to the clinic today for provider eval on new nodule to R elbow  Plan to set up US, per provider request.   -Patient discharged in stable condition. Reviewed calendar/next appointment, all questions answered.   -Patient ambulated out of clinic with ease with spouse.

## 2025-01-24 ENCOUNTER — APPOINTMENT (OUTPATIENT)
Dept: RADIOLOGY | Facility: HOSPITAL | Age: 65
End: 2025-01-24
Payer: MEDICARE

## 2025-01-28 ENCOUNTER — HOSPITAL ENCOUNTER (OUTPATIENT)
Dept: RADIOLOGY | Facility: HOSPITAL | Age: 65
Discharge: HOME | End: 2025-01-28
Payer: COMMERCIAL

## 2025-01-28 DIAGNOSIS — M79.89 NODULE OF SOFT TISSUE: ICD-10-CM

## 2025-01-28 DIAGNOSIS — C83.38 DIFFUSE LARGE B-CELL LYMPHOMA OF LYMPH NODES OF MULTIPLE REGIONS (MULTI): ICD-10-CM

## 2025-01-28 PROCEDURE — 76882 US LMTD JT/FCL EVL NVASC XTR: CPT | Performed by: RADIOLOGY

## 2025-01-28 PROCEDURE — 76882 US LMTD JT/FCL EVL NVASC XTR: CPT

## 2025-02-06 ENCOUNTER — HOSPITAL ENCOUNTER (OUTPATIENT)
Dept: RADIOLOGY | Facility: HOSPITAL | Age: 65
End: 2025-02-06
Payer: COMMERCIAL

## 2025-02-06 ENCOUNTER — APPOINTMENT (OUTPATIENT)
Dept: RADIOLOGY | Facility: HOSPITAL | Age: 65
End: 2025-02-06
Payer: MEDICARE

## 2025-02-06 ENCOUNTER — HOSPITAL ENCOUNTER (OUTPATIENT)
Dept: RADIOLOGY | Facility: HOSPITAL | Age: 65
Discharge: HOME | End: 2025-02-06
Payer: COMMERCIAL

## 2025-02-06 DIAGNOSIS — C83.38 DIFFUSE LARGE B-CELL LYMPHOMA OF LYMPH NODES OF MULTIPLE REGIONS (MULTI): ICD-10-CM

## 2025-02-07 ENCOUNTER — HOSPITAL ENCOUNTER (OUTPATIENT)
Dept: RADIOLOGY | Facility: HOSPITAL | Age: 65
Discharge: HOME | End: 2025-02-07
Payer: COMMERCIAL

## 2025-02-07 DIAGNOSIS — C83.38 DIFFUSE LARGE B-CELL LYMPHOMA OF LYMPH NODES OF MULTIPLE REGIONS (MULTI): ICD-10-CM

## 2025-02-07 PROCEDURE — 78815 PET IMAGE W/CT SKULL-THIGH: CPT | Mod: PS

## 2025-02-07 PROCEDURE — 3430000001 HC RX 343 DIAGNOSTIC RADIOPHARMACEUTICALS: Performed by: INTERNAL MEDICINE

## 2025-02-07 PROCEDURE — A9552 F18 FDG: HCPCS | Performed by: INTERNAL MEDICINE

## 2025-02-07 RX ORDER — FLUDEOXYGLUCOSE F 18 200 MCI/ML
11.8 INJECTION, SOLUTION INTRAVENOUS
Status: COMPLETED | OUTPATIENT
Start: 2025-02-07 | End: 2025-02-07

## 2025-02-07 RX ADMIN — FLUDEOXYGLUCOSE F 18 11.8 MILLICURIE: 200 INJECTION, SOLUTION INTRAVENOUS at 07:15

## 2025-02-13 ENCOUNTER — OFFICE VISIT (OUTPATIENT)
Dept: HEMATOLOGY/ONCOLOGY | Facility: HOSPITAL | Age: 65
End: 2025-02-13
Payer: MEDICARE

## 2025-02-13 ENCOUNTER — LAB (OUTPATIENT)
Dept: LAB | Facility: HOSPITAL | Age: 65
End: 2025-02-13
Payer: MEDICARE

## 2025-02-13 VITALS
DIASTOLIC BLOOD PRESSURE: 74 MMHG | TEMPERATURE: 98.2 F | OXYGEN SATURATION: 100 % | SYSTOLIC BLOOD PRESSURE: 140 MMHG | BODY MASS INDEX: 22.69 KG/M2 | WEIGHT: 168.8 LBS | HEART RATE: 56 BPM

## 2025-02-13 DIAGNOSIS — I82.4Y2 ACUTE DEEP VEIN THROMBOSIS (DVT) OF PROXIMAL VEIN OF LEFT LOWER EXTREMITY (MULTI): Primary | ICD-10-CM

## 2025-02-13 DIAGNOSIS — C83.38 DIFFUSE LARGE B-CELL LYMPHOMA OF LYMPH NODES OF MULTIPLE REGIONS (MULTI): ICD-10-CM

## 2025-02-13 LAB
ALBUMIN SERPL BCP-MCNC: 4.3 G/DL (ref 3.4–5)
ALP SERPL-CCNC: 61 U/L (ref 33–136)
ALT SERPL W P-5'-P-CCNC: 29 U/L (ref 10–52)
ANION GAP SERPL CALC-SCNC: 11 MMOL/L (ref 10–20)
AST SERPL W P-5'-P-CCNC: 27 U/L (ref 9–39)
BASOPHILS # BLD AUTO: 0.01 X10*3/UL (ref 0–0.1)
BASOPHILS NFR BLD AUTO: 0.2 %
BILIRUB SERPL-MCNC: 0.7 MG/DL (ref 0–1.2)
BUN SERPL-MCNC: 20 MG/DL (ref 6–23)
CALCIUM SERPL-MCNC: 9.6 MG/DL (ref 8.6–10.3)
CHLORIDE SERPL-SCNC: 103 MMOL/L (ref 98–107)
CO2 SERPL-SCNC: 29 MMOL/L (ref 21–32)
CREAT SERPL-MCNC: 1.05 MG/DL (ref 0.5–1.3)
EGFRCR SERPLBLD CKD-EPI 2021: 78 ML/MIN/1.73M*2
EOSINOPHIL # BLD AUTO: 0 X10*3/UL (ref 0–0.7)
EOSINOPHIL NFR BLD AUTO: 0 %
ERYTHROCYTE [DISTWIDTH] IN BLOOD BY AUTOMATED COUNT: 13.9 % (ref 11.5–14.5)
GLUCOSE SERPL-MCNC: 91 MG/DL (ref 74–99)
HCT VFR BLD AUTO: 36.8 % (ref 41–52)
HGB BLD-MCNC: 12.1 G/DL (ref 13.5–17.5)
IMM GRANULOCYTES # BLD AUTO: 0.01 X10*3/UL (ref 0–0.7)
IMM GRANULOCYTES NFR BLD AUTO: 0.2 % (ref 0–0.9)
LDH SERPL L TO P-CCNC: 183 U/L (ref 84–246)
LYMPHOCYTES # BLD AUTO: 0.73 X10*3/UL (ref 1.2–4.8)
LYMPHOCYTES NFR BLD AUTO: 18.1 %
MCH RBC QN AUTO: 32.9 PG (ref 26–34)
MCHC RBC AUTO-ENTMCNC: 32.9 G/DL (ref 32–36)
MCV RBC AUTO: 100 FL (ref 80–100)
MONOCYTES # BLD AUTO: 0.34 X10*3/UL (ref 0.1–1)
MONOCYTES NFR BLD AUTO: 8.4 %
NEUTROPHILS # BLD AUTO: 2.95 X10*3/UL (ref 1.2–7.7)
NEUTROPHILS NFR BLD AUTO: 73.1 %
NRBC BLD-RTO: 0 /100 WBCS (ref 0–0)
PLATELET # BLD AUTO: 159 X10*3/UL (ref 150–450)
POTASSIUM SERPL-SCNC: 4.3 MMOL/L (ref 3.5–5.3)
PROT SERPL-MCNC: 6.8 G/DL (ref 6.4–8.2)
RBC # BLD AUTO: 3.68 X10*6/UL (ref 4.5–5.9)
SODIUM SERPL-SCNC: 139 MMOL/L (ref 136–145)
WBC # BLD AUTO: 4 X10*3/UL (ref 4.4–11.3)

## 2025-02-13 PROCEDURE — 99214 OFFICE O/P EST MOD 30 MIN: CPT | Performed by: INTERNAL MEDICINE

## 2025-02-13 PROCEDURE — 1126F AMNT PAIN NOTED NONE PRSNT: CPT | Performed by: INTERNAL MEDICINE

## 2025-02-13 PROCEDURE — 36415 COLL VENOUS BLD VENIPUNCTURE: CPT

## 2025-02-13 PROCEDURE — 1157F ADVNC CARE PLAN IN RCRD: CPT | Performed by: INTERNAL MEDICINE

## 2025-02-13 PROCEDURE — 85025 COMPLETE CBC W/AUTO DIFF WBC: CPT

## 2025-02-13 PROCEDURE — 83615 LACTATE (LD) (LDH) ENZYME: CPT

## 2025-02-13 PROCEDURE — 80053 COMPREHEN METABOLIC PANEL: CPT

## 2025-02-13 ASSESSMENT — ENCOUNTER SYMPTOMS
ENDOCRINE NEGATIVE: 1
PSYCHIATRIC NEGATIVE: 1
CARDIOVASCULAR NEGATIVE: 1
EYES NEGATIVE: 1
HEMATOLOGIC/LYMPHATIC NEGATIVE: 1
NEUROLOGICAL NEGATIVE: 1
GASTROINTESTINAL NEGATIVE: 1
MUSCULOSKELETAL NEGATIVE: 1
FATIGUE: 1

## 2025-02-13 ASSESSMENT — PAIN SCALES - GENERAL: PAINLEVEL_OUTOF10: 0-NO PAIN

## 2025-02-13 NOTE — PROGRESS NOTES
Patient ID: Edmund Matthews is a 64 y.o. male.  Referring Physician: Heber Daley MD PhD  [id number] [place] [address]  Primary Care Provider: Mp Bee MD      Subjective    The patient has been in excellent health, but was recently diagnosed with early onset Alzheimer's. He is not on treatments, but chose to retire early. On 7/3/2024 he noted swelling on the left side of the nose and cheek bone. No pain. This was quickly worked up, including a visit with Dr. Mitchell of [facility], and biopsy on 8/9/2024, showing   A: MAXILLA, BIOPSY:  --  LIMITED SAMPLE WITH EXTENSIVE CRUSH ARTIFACT; FINDINGS CONCERNING FOR LARGE B-CELL LYMPHOMA, FINAL CLASSIFICATION PENDING GENETIC STUDIES.  SUBTYPE BY BUD CRITERIA: Germinal center type (CD10-, BCL6+, MUM1-).   MYC/BCL2 EXPRESSOR PHENOTYPE: Not a double expressor (BCL2+, C-MYC-).    9/26/2024: he tolerated first dose of RCHOP on 9/11. Today he feels well, but does have mild dry cough. Feels mild tingling sensation in the left face. No fever wt loss or night sweats. No pain or headache. No change in vision or swallowing. Memory deficit is mild and stable. He then received C2-3 on 10/2 and 10/23, respectively.     11/7: today he says he was very tired for 1 week after each chemo. He also developed a dry cough last week, but has no fever. The symptom is improving.     12/4: new edema on the left leg. Doppler confirmed DVT. Eliquis started.     12/19: feels more tired. No other complaints. No cough or fever.   1/9/2025: left leg remains mildly swollen. No fever, chest pain. No other complaints. C6 chemo was finished on 12/30. No numbness or nausea.         Review of Systems   Constitutional:  Positive for fatigue.   Eyes: Negative.    Cardiovascular: Negative.    Gastrointestinal: Negative.    Endocrine: Negative.    Genitourinary: Negative.     Musculoskeletal: Negative.    Skin: Negative.    Neurological: Negative.    Hematological: Negative.    Psychiatric/Behavioral:  Negative.          Objective   BSA: 1.98 meters squared  /74 (BP Location: Left arm, Patient Position: Sitting, BP Cuff Size: Adult)   Pulse 56   Temp 36.8 °C (98.2 °F)   Wt 76.6 kg (168 lb 12.8 oz)   SpO2 100%   BMI 22.69 kg/m²     No family history on file.  Oncology History   Diffuse large B-cell lymphoma of lymph nodes of multiple regions (Multi)   8/30/2024 Initial Diagnosis    Diffuse large B-cell lymphoma of lymph nodes of multiple regions (Multi)    The patient had been in excellent health, but was recently diagnosed with early onset Alzheimer's. He hose to retire early.     On 7/3/2024 he noted swelling on the left side of the nose and cheek bone. No pain. This was quickly worked up, including a visit with Dr. Mitchell of [facility], and biopsy on 8/9/2024, showing:     A: MAXILLA, BIOPSY:  --  LIMITED SAMPLE WITH EXTENSIVE CRUSH ARTIFACT; FINDINGS CONCERNING FOR LARGE B-CELL LYMPHOMA, FINAL CLASSIFICATION PENDING GENETIC STUDIES.  SUBTYPE BY BUD CRITERIA: Germinal center type (CD10-, BCL6+, MUM1-).   MYC/BCL2 EXPRESSOR PHENOTYPE: Not a double expressor (BCL2+, C-MYC-).    -Stage IV due to extranodal involvement (sinus, bone, premasillary soft tissue, orbit, nose) as shown below in the PET of 8/29:  Intensely hypermetabolic mass centered at the left maxilla causing osseous erosion and extending  into the left pre maxillary soft tissues, with involvement of the inferior margin of the left orbit and left lateral nose, as well as the left maxillary sinus with SUV max of 8.4.  -Tumor burden is relatively low, but location is high risk.  -Will need to assess orbits.   -For stage IV DLBCL, will need systemic chemo. R-CHOP is considered a standard. Furthermore, the MARTINEZ 3 is also a reasonable or preferred option, as it randomize RCHOP vs RCHOP + Odronextamab. He is interested in the study. Research staff is on site to help and will follow up on eligibility.   -9/5: due to existing Alzheimer's disease, he is  not eligible for the above study. Will pursue the SOC, I.e. RCHOP. The pre-planned treatment is 6 cycles. Will need to complete work up.   -9/26: MRI of the orbits show disease limited to the facial structure but does not show orbital or optic nerve involvement. ECHO was WNL. Will therefore continue RCHOP     9/11/2024 -  Chemotherapy    R-CHOP (Cyclophosphamide / DOXOrubicin / VinCRIStine / PredniSONE) + RiTUXimab, 21 Day Cycles         Edmund Matthews  reports that he has never smoked. He has never used smokeless tobacco.  He  has no history on file for alcohol use.  He  has no history on file for drug use.    Physical Exam  Constitutional:       General: He is not in acute distress.     Appearance: He is not toxic-appearing.   HENT:      Head: Normocephalic.      Nose: Nose normal.      Comments: A mass without clear border is appreciated between the left side of nose and the cheekbone. Non tender.   -9/26/2024: the mass is less prominent. However, it is not measurable.   -11/7: no mass.   -1/9/2025: no mass.      Mouth/Throat:      Mouth: Mucous membranes are moist.   Eyes:      Extraocular Movements: Extraocular movements intact.      Pupils: Pupils are equal, round, and reactive to light.   Cardiovascular:      Rate and Rhythm: Normal rate and regular rhythm.      Heart sounds: No murmur heard.  Pulmonary:      Effort: Pulmonary effort is normal.      Breath sounds: Normal breath sounds.   Abdominal:      General: Bowel sounds are normal.      Palpations: Abdomen is soft. There is no mass.      Tenderness: There is no abdominal tenderness. There is no rebound.   Musculoskeletal:         General: No swelling, tenderness, deformity or signs of injury.      Right lower leg: No edema.      Left lower leg: No edema.   Skin:     Coloration: Skin is not jaundiced.      Findings: No bruising, lesion or rash.   Neurological:      Mental Status: He is alert and oriented to person, place, and time.      Cranial Nerves: No  cranial nerve deficit.      Motor: No weakness.      Gait: Gait normal.   Psychiatric:         Mood and Affect: Mood normal.       Performance Status:  ECOG PS =1.    Assessment/Plan      A/P  ##DLBCL, newly dx, 8/9/2024:  - A: MAXILLA, BIOPSY:  --  LIMITED SAMPLE WITH EXTENSIVE CRUSH ARTIFACT; FINDINGS CONCERNING FOR LARGE B-CELL LYMPHOMA, FINAL CLASSIFICATION PENDING GENETIC STUDIES.  SUBTYPE BY BUD CRITERIA: Germinal center type (CD10-, BCL6+, MUM1-).   MYC/BCL2 EXPRESSOR PHENOTYPE: Not a double expressor (BCL2+, C-MYC-).  -Stage IV due to extranodal involvement (sinus, bone, premasillary soft tissue, orbit, nose) as shown below in the PET of 8/29:  Intensely hypermetabolic mass centered at the left maxilla causing osseous erosion and extending  into the left pre maxillary soft tissues, with involvement of the inferior margin of the left orbit and left lateral nose, as well as the left maxillary sinus with SUV max of 8.4.  -Tumor burden is relatively low, but location is high risk.  -Will need to assess orbits.   -For stage IV DLBCL, will need systemic chemo. R-CHOP is considered a standard. Furthermore, the MARTINEZ 3 is also a reasonable or preferred option, as it randomize RCHOP vs RCHOP + Odronextamab. He is interested in the study. Research staff is on site to help and will follow up on eligibility.   -9/5: due to existing Alzheimer's disease, he is not eligible for the above study. Will pursue the SOC, I.e. RCHOP. The pre-planned treatment is 6 cycles. Will need to complete work up.   -9/26: MRI of the orbits show disease limited to the facial structure but does not show orbital or optic nerve involvement. ECHO was WNL. Will therefore continue RCHOP, c2 on 10/2, then 10/23.   -11/7: Finished 3 cycles of RCHOP, and has mild - moderate fatigue for 1w. Otherwise doing well. Will continue same. Next cycles: 11/13, 12/4, 12/26.   -12/19: started c5 RCHOP on 12/4, and tolerated well, but still has fatigue.  Unexpectedly he developed DVT on 12/4 in the left leg. Had PET/CT today, results pending. C6 RCHOP is tentatively scheduled on 12/26.    -1/9/2025: Has finished all planned chemo, C6 on 12/30. Recovering well. No obvious AE. Will restage.     #DVT  -see above. On eliquis since 12/4, with improvement.  -1/9: residual swelling. Continue Eliquis.     Plan 2/13/2025:  Repeat LLE Doppler for DVT monitoring.  Continue Eliquis (12/4/2024 -->)  -RTC tele after Doppler  -RTC BEBETO 3mon.   -In the future, q6mon for the first 2y.     Time spent: 35 min.        Heber Daley MD PhD

## 2025-02-18 ENCOUNTER — HOSPITAL ENCOUNTER (OUTPATIENT)
Dept: VASCULAR MEDICINE | Facility: HOSPITAL | Age: 65
Discharge: HOME | End: 2025-02-18
Payer: MEDICARE

## 2025-02-18 DIAGNOSIS — M79.89 OTHER SPECIFIED SOFT TISSUE DISORDERS: ICD-10-CM

## 2025-02-18 DIAGNOSIS — C83.38 DIFFUSE LARGE B-CELL LYMPHOMA OF LYMPH NODES OF MULTIPLE REGIONS (MULTI): ICD-10-CM

## 2025-02-18 DIAGNOSIS — I82.4Y2 ACUTE DEEP VEIN THROMBOSIS (DVT) OF PROXIMAL VEIN OF LEFT LOWER EXTREMITY (MULTI): ICD-10-CM

## 2025-02-18 PROCEDURE — 93970 EXTREMITY STUDY: CPT

## 2025-02-18 PROCEDURE — 93970 EXTREMITY STUDY: CPT | Performed by: SURGERY

## 2025-02-21 ENCOUNTER — TELEMEDICINE (OUTPATIENT)
Dept: HEMATOLOGY/ONCOLOGY | Facility: HOSPITAL | Age: 65
End: 2025-02-21
Payer: MEDICARE

## 2025-02-21 DIAGNOSIS — C83.38 DIFFUSE LARGE B-CELL LYMPHOMA OF LYMPH NODES OF MULTIPLE REGIONS (MULTI): ICD-10-CM

## 2025-02-21 DIAGNOSIS — I82.4Y2 ACUTE DEEP VEIN THROMBOSIS (DVT) OF PROXIMAL VEIN OF LEFT LOWER EXTREMITY (MULTI): ICD-10-CM

## 2025-05-15 ENCOUNTER — OFFICE VISIT (OUTPATIENT)
Dept: HEMATOLOGY/ONCOLOGY | Facility: HOSPITAL | Age: 65
End: 2025-05-15
Payer: MEDICARE

## 2025-05-15 ENCOUNTER — LAB (OUTPATIENT)
Dept: LAB | Facility: HOSPITAL | Age: 65
End: 2025-05-15
Payer: MEDICARE

## 2025-05-15 VITALS
WEIGHT: 162.2 LBS | BODY MASS INDEX: 21.8 KG/M2 | OXYGEN SATURATION: 99 % | TEMPERATURE: 97.2 F | SYSTOLIC BLOOD PRESSURE: 131 MMHG | RESPIRATION RATE: 16 BRPM | HEART RATE: 53 BPM | DIASTOLIC BLOOD PRESSURE: 72 MMHG

## 2025-05-15 DIAGNOSIS — M79.89 LEFT LEG SWELLING: ICD-10-CM

## 2025-05-15 DIAGNOSIS — F02.A0 MILD EARLY ONSET ALZHEIMER'S DEMENTIA, UNSPECIFIED WHETHER BEHAVIORAL, PSYCHOTIC, OR MOOD DISTURBANCE OR ANXIETY: Primary | ICD-10-CM

## 2025-05-15 DIAGNOSIS — R26.89 BALANCE PROBLEM: ICD-10-CM

## 2025-05-15 DIAGNOSIS — C83.38 DIFFUSE LARGE B-CELL LYMPHOMA OF LYMPH NODES OF MULTIPLE REGIONS (MULTI): ICD-10-CM

## 2025-05-15 DIAGNOSIS — R07.9 CHEST PAIN, UNSPECIFIED TYPE: ICD-10-CM

## 2025-05-15 DIAGNOSIS — G30.0 MILD EARLY ONSET ALZHEIMER'S DEMENTIA, UNSPECIFIED WHETHER BEHAVIORAL, PSYCHOTIC, OR MOOD DISTURBANCE OR ANXIETY: Primary | ICD-10-CM

## 2025-05-15 DIAGNOSIS — I82.4Y2 ACUTE DEEP VEIN THROMBOSIS (DVT) OF PROXIMAL VEIN OF LEFT LOWER EXTREMITY: ICD-10-CM

## 2025-05-15 DIAGNOSIS — I82.402 DEEP VEIN THROMBOSIS (DVT) OF LEFT LOWER EXTREMITY, UNSPECIFIED CHRONICITY, UNSPECIFIED VEIN: ICD-10-CM

## 2025-05-15 LAB
ALBUMIN SERPL BCP-MCNC: 4.2 G/DL (ref 3.4–5)
ALP SERPL-CCNC: 67 U/L (ref 33–136)
ALT SERPL W P-5'-P-CCNC: 23 U/L (ref 10–52)
ANION GAP SERPL CALC-SCNC: 9 MMOL/L (ref 10–20)
AST SERPL W P-5'-P-CCNC: 22 U/L (ref 9–39)
BASOPHILS # BLD AUTO: 0.02 X10*3/UL (ref 0–0.1)
BASOPHILS NFR BLD AUTO: 0.7 %
BILIRUB SERPL-MCNC: 1 MG/DL (ref 0–1.2)
BUN SERPL-MCNC: 16 MG/DL (ref 6–23)
CALCIUM SERPL-MCNC: 9.4 MG/DL (ref 8.6–10.3)
CHLORIDE SERPL-SCNC: 105 MMOL/L (ref 98–107)
CO2 SERPL-SCNC: 28 MMOL/L (ref 21–32)
CREAT SERPL-MCNC: 1.18 MG/DL (ref 0.5–1.3)
EGFRCR SERPLBLD CKD-EPI 2021: 68 ML/MIN/1.73M*2
EOSINOPHIL # BLD AUTO: 0 X10*3/UL (ref 0–0.7)
EOSINOPHIL NFR BLD AUTO: 0 %
ERYTHROCYTE [DISTWIDTH] IN BLOOD BY AUTOMATED COUNT: 12.8 % (ref 11.5–14.5)
GLUCOSE SERPL-MCNC: 137 MG/DL (ref 74–99)
HCT VFR BLD AUTO: 38.5 % (ref 41–52)
HGB BLD-MCNC: 12.8 G/DL (ref 13.5–17.5)
IMM GRANULOCYTES # BLD AUTO: 0 X10*3/UL (ref 0–0.7)
IMM GRANULOCYTES NFR BLD AUTO: 0 % (ref 0–0.9)
LDH SERPL L TO P-CCNC: 191 U/L (ref 84–246)
LYMPHOCYTES # BLD AUTO: 0.82 X10*3/UL (ref 1.2–4.8)
LYMPHOCYTES NFR BLD AUTO: 27.1 %
MCH RBC QN AUTO: 32.4 PG (ref 26–34)
MCHC RBC AUTO-ENTMCNC: 33.2 G/DL (ref 32–36)
MCV RBC AUTO: 98 FL (ref 80–100)
MONOCYTES # BLD AUTO: 0.2 X10*3/UL (ref 0.1–1)
MONOCYTES NFR BLD AUTO: 6.6 %
NEUTROPHILS # BLD AUTO: 1.99 X10*3/UL (ref 1.2–7.7)
NEUTROPHILS NFR BLD AUTO: 65.6 %
NRBC BLD-RTO: 0 /100 WBCS (ref 0–0)
PLATELET # BLD AUTO: 145 X10*3/UL (ref 150–450)
POTASSIUM SERPL-SCNC: 4 MMOL/L (ref 3.5–5.3)
PROT SERPL-MCNC: 6.7 G/DL (ref 6.4–8.2)
RBC # BLD AUTO: 3.95 X10*6/UL (ref 4.5–5.9)
SODIUM SERPL-SCNC: 138 MMOL/L (ref 136–145)
WBC # BLD AUTO: 3 X10*3/UL (ref 4.4–11.3)

## 2025-05-15 PROCEDURE — 36415 COLL VENOUS BLD VENIPUNCTURE: CPT

## 2025-05-15 PROCEDURE — 83615 LACTATE (LD) (LDH) ENZYME: CPT

## 2025-05-15 PROCEDURE — G2211 COMPLEX E/M VISIT ADD ON: HCPCS

## 2025-05-15 PROCEDURE — 84075 ASSAY ALKALINE PHOSPHATASE: CPT

## 2025-05-15 PROCEDURE — 1126F AMNT PAIN NOTED NONE PRSNT: CPT

## 2025-05-15 PROCEDURE — 85025 COMPLETE CBC W/AUTO DIFF WBC: CPT

## 2025-05-15 PROCEDURE — 99215 OFFICE O/P EST HI 40 MIN: CPT

## 2025-05-15 PROCEDURE — 93005 ELECTROCARDIOGRAM TRACING: CPT

## 2025-05-15 ASSESSMENT — PAIN SCALES - GENERAL: PAINLEVEL_OUTOF10: 0-NO PAIN

## 2025-05-16 ASSESSMENT — ENCOUNTER SYMPTOMS
ADENOPATHY: 0
DYSURIA: 0
SHORTNESS OF BREATH: 0
WHEEZING: 0
CONFUSION: 0
FEVER: 0
PALPITATIONS: 0
DIZZINESS: 0
NAUSEA: 0
HEADACHES: 0
CHILLS: 0
ABDOMINAL PAIN: 0
WOUND: 0
VOMITING: 0
FATIGUE: 0
DIARRHEA: 0
COUGH: 0
BRUISES/BLEEDS EASILY: 0
APPETITE CHANGE: 0
LEG SWELLING: 0
DEPRESSION: 0
NERVOUS/ANXIOUS: 0

## 2025-05-16 NOTE — PROGRESS NOTES
Patient ID: Edmund Matthews is a 64 y.o. male.       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 He hose to retire early.     On 7/3/2024 he noted swelling on the left side of the nose and cheek bone. No pain. This was quickly worked up, including a visit with Dr. Mitchell of [facility], and biopsy on 8/9/2024, showing:     A: MAXILLA, BIOPSY:  --  LIMITED SAMPLE WITH EXTENSIVE CRUSH ARTIFACT; FINDINGS CONCERNING FOR LARGE B-CELL LYMPHOMA, FINAL CLASSIFICATION PENDING GENETIC STUDIES.  SUBTYPE BY BUD CRITERIA: Germinal center type (CD10-, BCL6+, MUM1-).   MYC/BCL2 EXPRESSOR PHENOTYPE: Not a double expressor (BCL2+, C-MYC-).    -Stage IV due to extranodal involvement (sinus, bone, premasillary soft tissue, orbit, nose) as shown below in the PET of 8/29:  Intensely hypermetabolic mass centered at the left maxilla causing osseous erosion and extending  into the left pre maxillary soft tissues, with involvement of the inferior margin of the left orbit and left lateral nose, as well as the left maxillary sinus with SUV max of 8.4.  -Tumor burden is relatively low, but location is high risk.  -Will need to assess orbits.   -For stage IV DLBCL, will need systemic chemo. R-CHOP is considered a standard. Furthermore, the MARTINEZ 3 is also a reasonable or preferred option, as it randomize RCHOP vs RCHOP + Odronextamab. He is interested in the study. Research staff is on site to help and will follow up on eligibility.   -9/5: due to existing Alzheimer's disease, he is not eligible for the above study. Will pursue the SOC, I.e. RCHOP. The pre-planned treatment is 6 cycles. Will need to complete work up.   -9/26: MRI of the orbits show disease limited to the  facial structure but does not show orbital or optic nerve involvement. ECHO was WNL. Will therefore continue RCHOP     9/11/2024 -  Chemotherapy    R-CHOP (Cyclophosphamide / DOXOrubicin / VinCRIStine / PredniSONE) + RiTUXimab, 21 Day Cycles         05/16/25 Here today for routine follow up for DLBCL.     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  SUBTYPE BY BUD CRITERIA: Germinal center type (CD10-, BCL6+, MUM1-).   MYC/BCL2 EXPRESSOR PHENOTYPE: Not a double expressor (BCL2+, C-MYC-).  -Stage IV due to extranodal involvement (sinus, bone, premasillary soft tissue, orbit, nose) as shown below in the PET of 8/29:  Intensely hypermetabolic mass centered at the left maxilla causing osseous erosion and extending  into the left pre maxillary soft tissues, with involvement of the inferior margin of the left orbit and left lateral nose, as well as the left maxillary sinus with SUV max of 8.4.  -Tumor burden is relatively low, but location is high risk.  -Will need to assess orbits.   -For stage IV DLBCL, will need systemic chemo. R-CHOP is considered a standard. Furthermore, the MARTINEZ 3 is also a reasonable or preferred option, as it randomize RCHOP vs RCHOP + Odronextamab. He is interested in the study. Research staff is on site to help and will follow up on eligibility.   -9/5: due to existing Alzheimer's disease, he is not eligible for the above study. Will pursue the SOC, I.e. RCHOP. The pre-planned treatment is 6 cycles. Will need to complete work up.   -9/26: MRI of the orbits show disease limited to the facial structure but does not show orbital or optic nerve involvement. ECHO was WNL. Will therefore continue RCHOP, c2 on 10/2, then 10/23.   -11/7: Finished 3 cycles of RCHOP, and has mild - moderate  fatigue for 1w. Otherwise doing well. Will continue same. Next cycles: 11/13, 12/4, 12/26.   -12/19: started c5 RCHOP on 12/4, and tolerated well, but still has fatigue. Unexpectedly he developed DVT on 12/4 in the left leg. Had PET/CT today, results pending. C6 RCHOP is tentatively scheduled on 12/26.    -1/9/2025: Has finished all planned chemo, C6 on 12/30/2024. Recovering well. No obvious AE. Will restage.   -2/13/2025: discussed EOT PET/CT showing CR, with DS =1. Will repeat scan q6mon.    -5/15/25: Clinically stable, Hgb 12.8, WBC 3, plt 145. Plt downtrended. Next scan in August.   Mild early onset Alzheimer's dementia, unspecified whether behavioral, psychotic, or mood disturbance or anxiety    Balance problem  -Put in outgoing referral to PT   Chest pain, unspecified type  -EKG in clinic showed most likely junctional bradycardia   -Put in referral to PCP to establish car  Deep vein thrombosis (DVT) of left lower extremity, unspecified chronicity, unspecified vein  -resume eliquis starting 5/15/25, has been off of it seen March 2025  -No redness, temperature differences, fever still has some persistent left leg swelling   -Doppler in February showed acute non-occlusive deep vein thrombosis visualized in the proximal femoral, mid femoral, distal femoral, popliteal and posterior tibial veins. Doppler in December showed that these were previously occlusive     Plan   PET/CT and follow up with Dr Daley in August   Resume eliquis 5mg BID for history of DVT   Messaged scheduling to get PCP appt scheduled   Advised  to call the answering service for any new or worsening symptoms       SUBJECTIVE     HPI    he patient has been in excellent health, but was recently diagnosed with early onset Alzheimer's. He is not on treatments, but chose to retire early. On 7/3/2024 he noted swelling on the left side of the nose and cheek bone. No pain. This was quickly worked up, including a visit with Dr. Mitchell of [facility], and biopsy  on 8/9/2024, showing   A: MAXILLA, BIOPSY:  --  LIMITED SAMPLE WITH EXTENSIVE CRUSH ARTIFACT; FINDINGS CONCERNING FOR LARGE B-CELL LYMPHOMA, FINAL CLASSIFICATION PENDING GENETIC STUDIES.  SUBTYPE BY BUD CRITERIA: Germinal center type (CD10-, BCL6+, MUM1-).   MYC/BCL2 EXPRESSOR PHENOTYPE: Not a double expressor (BCL2+, C-MYC-).     9/26/2024: he tolerated first dose of RCHOP on 9/11. Today he feels well, but does have mild dry cough. Feels mild tingling sensation in the left face. No fever wt loss or night sweats. No pain or headache. No change in vision or swallowing. Memory deficit is mild and stable. He then received C2-3 on 10/2 and 10/23, respectively.      11/7: today he says he was very tired for 1 week after each chemo. He also developed a dry cough last week, but has no fever. The symptom is improving.      12/4: new edema on the left leg. Doppler confirmed DVT. Eliquis started.      12/19: feels more tired. No other complaints. No cough or fever.   1/9/2025: left leg remains mildly swollen. No fever, chest pain. No other complaints. C6 chemo was finished on 12/30. No numbness or nausea.      2/13/2025: finished all 6 cycles of planned chemo RCHOP. Doing well. Still has swelling of LLE. No SOB or chest pain. Legs feel weak when walking stairs.     Edmund Matthews presents today for follow up, accompanied by his wife. Overall he states he is doing well. His wife states she has noticed him being a little off balance more when he is being active. He denies any falls. Still is active in the yard. He denies any dizziness or lightheadedness.     He does admit to some on and off chest pain. He states it is random when it comes on. States it is like a dull achey pain that comes and goes. Denies palpitations. Does not radiate anywhere and goes away on its own. Not worse with activity. No hx of a fib or other heart conditions.     Otherwise feeling well. No fevers, chills, nausea, vomiting, diarrhea, rashes, bruising or  bleeding.     Review of Systems   Constitutional:  Negative for appetite change, chills, fatigue and fever.   HENT:   Negative for mouth sores.    Respiratory:  Negative for cough, shortness of breath and wheezing.    Cardiovascular:  Positive for chest pain (on and off). Negative for leg swelling and palpitations.   Gastrointestinal:  Negative for abdominal pain, diarrhea, nausea and vomiting.   Genitourinary:  Negative for dysuria.    Musculoskeletal:  Positive for gait problem.   Skin:  Negative for itching, rash and wound.   Neurological:  Positive for gait problem. Negative for dizziness and headaches.   Hematological:  Negative for adenopathy. Does not bruise/bleed easily.   Psychiatric/Behavioral:  Negative for confusion and depression. The patient is not nervous/anxious.        Medical History[1]  Social History[2]   Surgical History[3]      OBJECTIVE     BSA: 1.94 meters squared  /72 (BP Location: Right arm, Patient Position: Sitting, BP Cuff Size: Adult)   Pulse 53   Temp 36.2 °C (97.2 °F)   Resp 16   Wt 73.6 kg (162 lb 3.2 oz)   SpO2 99%   BMI 21.80 kg/m²        Physical Exam  Constitutional:       Appearance: Normal appearance.   HENT:      Head: Normocephalic and atraumatic.   Cardiovascular:      Rate and Rhythm: Normal rate and regular rhythm.      Pulses: Normal pulses.      Heart sounds: Normal heart sounds.   Pulmonary:      Effort: Pulmonary effort is normal. No respiratory distress.      Breath sounds: Normal breath sounds.   Abdominal:      General: There is no distension.      Palpations: Abdomen is soft.      Tenderness: There is no abdominal tenderness.   Musculoskeletal:         General: Normal range of motion.      Cervical back: Normal range of motion.      Right lower leg: No edema.      Left lower leg: No edema.   Lymphadenopathy:      Cervical: No cervical adenopathy.      Upper Body:      Right upper body: No supraclavicular or axillary adenopathy.      Left upper body: No  supraclavicular or axillary adenopathy.   Skin:     General: Skin is warm.      Coloration: Skin is not jaundiced.      Findings: No bruising.   Neurological:      General: No focal deficit present.      Mental Status: He is alert and oriented to person, place, and time.   Psychiatric:         Mood and Affect: Mood normal.         Behavior: Behavior normal.         Thought Content: Thought content normal.         Judgment: Judgment normal.         Performance Status:  Karnofsky Score: 90 - Able to carry on normal activity; minor signs or symptoms of disease            Jennifer Dolan PA-C                  [1] No past medical history on file.  [2]   Social History  Tobacco Use    Smoking status: Never    Smokeless tobacco: Never   [3]   Past Surgical History:  Procedure Laterality Date    OTHER SURGICAL HISTORY  01/03/2022    Cleft palate repair

## 2025-05-16 NOTE — ASSESSMENT & PLAN NOTE
A: MAXILLA, BIOPSY:  --  LIMITED SAMPLE WITH EXTENSIVE CRUSH ARTIFACT; FINDINGS CONCERNING FOR LARGE B-CELL LYMPHOMA, FINAL CLASSIFICATION PENDING GENETIC STUDIES.  SUBTYPE BY BUD CRITERIA: Germinal center type (CD10-, BCL6+, MUM1-).   MYC/BCL2 EXPRESSOR PHENOTYPE: Not a double expressor (BCL2+, C-MYC-).  -Stage IV due to extranodal involvement (sinus, bone, premasillary soft tissue, orbit, nose) as shown below in the PET of 8/29:  Intensely hypermetabolic mass centered at the left maxilla causing osseous erosion and extending  into the left pre maxillary soft tissues, with involvement of the inferior margin of the left orbit and left lateral nose, as well as the left maxillary sinus with SUV max of 8.4.  -Tumor burden is relatively low, but location is high risk.  -Will need to assess orbits.   -For stage IV DLBCL, will need systemic chemo. R-CHOP is considered a standard. Furthermore, the MARTINEZ 3 is also a reasonable or preferred option, as it randomize RCHOP vs RCHOP + Odronextamab. He is interested in the study. Research staff is on site to help and will follow up on eligibility.   -9/5: due to existing Alzheimer's disease, he is not eligible for the above study. Will pursue the SOC, I.e. RCHOP. The pre-planned treatment is 6 cycles. Will need to complete work up.   -9/26: MRI of the orbits show disease limited to the facial structure but does not show orbital or optic nerve involvement. ECHO was WNL. Will therefore continue RCHOP, c2 on 10/2, then 10/23.   -11/7: Finished 3 cycles of RCHOP, and has mild - moderate fatigue for 1w. Otherwise doing well. Will continue same. Next cycles: 11/13, 12/4, 12/26.   -12/19: started c5 RCHOP on 12/4, and tolerated well, but still has fatigue. Unexpectedly he developed DVT on 12/4 in the left leg. Had PET/CT today, results pending. C6 RCHOP is tentatively scheduled on 12/26.    -1/9/2025: Has finished all planned chemo, C6 on 12/30/2024. Recovering well. No obvious AE.  Will restage.   -2/13/2025: discussed EOT PET/CT showing CR, with DS =1. Will repeat scan q6mon.    -5/15/25: Clinically stable, Hgb 12.8, WBC 3, plt 145. Plt downtrended. Next scan in August.

## 2025-05-23 LAB
ATRIAL RATE: 48 BPM
P AXIS: -78 DEGREES
P OFFSET: 202 MS
P ONSET: 152 MS
PR INTERVAL: 134 MS
Q ONSET: 219 MS
QRS COUNT: 8 BEATS
QRS DURATION: 110 MS
QT INTERVAL: 494 MS
QTC CALCULATION(BAZETT): 441 MS
QTC FREDERICIA: 458 MS
R AXIS: 87 DEGREES
T AXIS: 72 DEGREES
T OFFSET: 466 MS
VENTRICULAR RATE: 48 BPM

## 2025-06-05 ENCOUNTER — APPOINTMENT (OUTPATIENT)
Dept: PRIMARY CARE | Facility: CLINIC | Age: 65
End: 2025-06-05
Payer: MEDICARE

## 2025-06-18 ENCOUNTER — APPOINTMENT (OUTPATIENT)
Dept: PRIMARY CARE | Facility: CLINIC | Age: 65
End: 2025-06-18
Payer: MEDICARE

## 2025-06-18 VITALS
RESPIRATION RATE: 15 BRPM | BODY MASS INDEX: 22 KG/M2 | SYSTOLIC BLOOD PRESSURE: 132 MMHG | DIASTOLIC BLOOD PRESSURE: 77 MMHG | HEIGHT: 73 IN | WEIGHT: 166 LBS | HEART RATE: 68 BPM

## 2025-06-18 DIAGNOSIS — Z00.00 ROUTINE MEDICAL EXAM: ICD-10-CM

## 2025-06-18 DIAGNOSIS — F02.A0 MILD EARLY ONSET ALZHEIMER'S DEMENTIA WITHOUT BEHAVIORAL DISTURBANCE, PSYCHOTIC DISTURBANCE, MOOD DISTURBANCE, OR ANXIETY (MULTI): Primary | ICD-10-CM

## 2025-06-18 DIAGNOSIS — G30.0 MILD EARLY ONSET ALZHEIMER'S DEMENTIA WITHOUT BEHAVIORAL DISTURBANCE, PSYCHOTIC DISTURBANCE, MOOD DISTURBANCE, OR ANXIETY (MULTI): Primary | ICD-10-CM

## 2025-06-18 DIAGNOSIS — Z12.11 COLON CANCER SCREENING: ICD-10-CM

## 2025-06-18 PROBLEM — J34.2 DEVIATED NASAL SEPTUM: Status: ACTIVE | Noted: 2024-07-17

## 2025-06-18 PROBLEM — N40.0 BPH (BENIGN PROSTATIC HYPERPLASIA): Status: ACTIVE | Noted: 2025-06-18

## 2025-06-18 PROBLEM — N28.1 RENAL CYST: Status: ACTIVE | Noted: 2025-06-18

## 2025-06-18 ASSESSMENT — ACTIVITIES OF DAILY LIVING (ADL)
BATHING: INDEPENDENT
DOING_HOUSEWORK: INDEPENDENT
TAKING_MEDICATION: INDEPENDENT
DRESSING: INDEPENDENT
MANAGING_FINANCES: INDEPENDENT
GROCERY_SHOPPING: INDEPENDENT

## 2025-06-18 ASSESSMENT — PATIENT HEALTH QUESTIONNAIRE - PHQ9
2. FEELING DOWN, DEPRESSED OR HOPELESS: NOT AT ALL
SUM OF ALL RESPONSES TO PHQ9 QUESTIONS 1 AND 2: 0
1. LITTLE INTEREST OR PLEASURE IN DOING THINGS: NOT AT ALL

## 2025-06-18 NOTE — PROGRESS NOTES
"Subjective   Patient ID: Edmund Matthews is a 64 y.o. male who presents for Medicare Annual Wellness Visit Initial (Est pcp).    HPI   Pt with hx of dementia came in for est pcp. Pt denies Memory Loss, Trouble finding the right words, understanding conversations or following instructions.   Pt denies Getting lost in familiar places, or having difficulty with tasks like reading or driving. Pt denies having Difficulty making decisions, planning, or managing finances. No  impulsive behavior. pt denies depressed mood, lack of interests, fatigue, mood swings or  HI/SI. No hallucination or delusion. Normal appetite.  good sleep. No cp, heart palpitation or LE edema. No HA, dizziness, imbalance. Normal urination    Review of Systems    Objective   /77   Pulse 68   Resp 15   Ht 1.854 m (6' 1\")   Wt 75.3 kg (166 lb)   BMI 21.90 kg/m²     Physical Exam  A&Ox3, No acute distress. Eyes: PERRLA, EMOI, No conjunctiva erythema. Ears: TM was normal. No sinus tenderness upon palpation. No nasal discharge. No erythematous pharynx. No cervical lymph node tenderness or enlargement. Neck is supple. Lungs: CTA b/l, Heart: RRR, capillary refill was less than 2 seconds. Abdomen: soft, non-tenderness. Bowel sound: normal. Normal strength and sensation at b/l ext. Patient walks with a good balance. CNII-XII were grossly intact. rhomberg was negative.  No depressed mood. MMSE was at 24/30. 3 min recall was at 3/3      Assessment/Plan     Assessment & Plan  Colon cancer screening    Orders:    Cologuard® colon cancer screening; Future    Mild early onset Alzheimer's dementia without behavioral disturbance, psychotic disturbance, mood disturbance, or anxiety (Multi)  Recommend to have neuro psych eval. Pt declined brain mri, neuro eval and labs tests for now.  Call if memory los get worse. Fu in 3 mos  Orders:    Referral to Adult Neuropsychology; Future    Routine medical exam [Z00.00]  Medicare wellness visit: pt was capable of " performing all ADLs and IADLs. Pt has stable memory and cognitive function.  Recommend healthy diet and regular exercise.  Advise eye exam by an OD yearly for glaucoma screen and dental exam every 6 months.    will monitor blood pressure, cholesterol levels and weight regularly. Recommend sunscreen application if exposed to the sun when the UV index is over 2. Recommend vaccine shots as indicated in the patient's Care Gaps in Epic.   recommend cologuard, Recommend pt to clear clutters on floor at home to prevent falls. recommend to update living will and DPOA

## 2025-06-18 NOTE — ASSESSMENT & PLAN NOTE
Recommend to have neuro psych eval. Pt declined brain mri, neuro eval and labs tests for now.  Call if memory los get worse. Fu in 3 mos  Orders:    Referral to Adult Neuropsychology; Future

## 2025-06-25 LAB — NONINV COLON CA DNA+OCC BLD SCRN STL QL: NORMAL

## 2025-07-10 LAB — NONINV COLON CA DNA+OCC BLD SCRN STL QL: NEGATIVE

## 2025-07-18 ENCOUNTER — APPOINTMENT (OUTPATIENT)
Dept: PRIMARY CARE | Facility: CLINIC | Age: 65
End: 2025-07-18
Payer: MEDICARE

## 2025-07-23 ENCOUNTER — APPOINTMENT (OUTPATIENT)
Dept: PRIMARY CARE | Facility: CLINIC | Age: 65
End: 2025-07-23
Payer: MEDICARE

## 2025-07-23 VITALS — SYSTOLIC BLOOD PRESSURE: 123 MMHG | DIASTOLIC BLOOD PRESSURE: 66 MMHG

## 2025-07-23 DIAGNOSIS — F02.A0 MILD EARLY ONSET ALZHEIMER'S DEMENTIA WITHOUT BEHAVIORAL DISTURBANCE, PSYCHOTIC DISTURBANCE, MOOD DISTURBANCE, OR ANXIETY (MULTI): Primary | ICD-10-CM

## 2025-07-23 DIAGNOSIS — G30.0 MILD EARLY ONSET ALZHEIMER'S DEMENTIA WITHOUT BEHAVIORAL DISTURBANCE, PSYCHOTIC DISTURBANCE, MOOD DISTURBANCE, OR ANXIETY (MULTI): Primary | ICD-10-CM

## 2025-07-23 PROCEDURE — G2211 COMPLEX E/M VISIT ADD ON: HCPCS | Performed by: FAMILY MEDICINE

## 2025-07-23 PROCEDURE — 99213 OFFICE O/P EST LOW 20 MIN: CPT | Performed by: FAMILY MEDICINE

## 2025-07-23 RX ORDER — DONEPEZIL HYDROCHLORIDE 5 MG/1
5 TABLET, FILM COATED ORAL NIGHTLY
Qty: 30 TABLET | Refills: 0 | Status: SHIPPED | OUTPATIENT
Start: 2025-07-23 | End: 2026-01-19

## 2025-07-23 NOTE — ASSESSMENT & PLAN NOTE
Start aricept and check labs. Neuro eval. Fu in 1 mos  Orders:    donepezil (Aricept) 5 mg tablet; Take 1 tablet (5 mg) by mouth once daily at bedtime.    Referral to Neurology; Future    Syphilis Screen with Reflex; Future    Basic Metabolic Panel; Future    Hemoglobin A1C; Future    CBC; Future    TSH with reflex to Free T4 if abnormal; Future    GIOVANNA with Reflex to KATELYNN; Future    Sedimentation Rate; Future    Vitamin B12; Future    Folate; Future

## 2025-07-23 NOTE — PROGRESS NOTES
Subjective   Patient ID: Edmund Matthews is a 65 y.o. male who presents for memory loss    HPI   memory loss persisted. Pt has not scheduled to see a neuropsychiatrist for eval yet. No depression or agitation. Good appetite.   Review of Systems    Objective   /66     Physical Exam  NAD, well groomed, No sclera icterus.  lungs: CTA b/l, heart: RRR,  abd: soft, no tenderness, BS+,  Good balance. Unchanged memory from last appt. No depressed mood, flat affect, HI/SI or paranoia.    Assessment/Plan   Assessment & Plan  Mild early onset Alzheimer's dementia without behavioral disturbance, psychotic disturbance, mood disturbance, or anxiety (Multi)  Start aricept and check labs. Neuro eval. Fu in 1 mos  Orders:    donepezil (Aricept) 5 mg tablet; Take 1 tablet (5 mg) by mouth once daily at bedtime.    Referral to Neurology; Future    Syphilis Screen with Reflex; Future    Basic Metabolic Panel; Future    Hemoglobin A1C; Future    CBC; Future    TSH with reflex to Free T4 if abnormal; Future    GIOVANNA with Reflex to KATELYNN; Future    Sedimentation Rate; Future    Vitamin B12; Future    Folate; Future

## 2025-07-26 DIAGNOSIS — D72.818 OTHER DECREASED WHITE BLOOD CELL (WBC) COUNT: Primary | ICD-10-CM

## 2025-07-26 LAB
ANA SER QL IF: NORMAL
ANION GAP SERPL CALCULATED.4IONS-SCNC: 11 MMOL/L (CALC) (ref 7–17)
BUN SERPL-MCNC: 24 MG/DL (ref 7–25)
BUN/CREAT SERPL: 16 (CALC) (ref 6–22)
CALCIUM SERPL-MCNC: 9.6 MG/DL (ref 8.6–10.3)
CHLORIDE SERPL-SCNC: 105 MMOL/L (ref 98–110)
CO2 SERPL-SCNC: 26 MMOL/L (ref 20–32)
CREAT SERPL-MCNC: 1.47 MG/DL (ref 0.7–1.35)
EGFRCR SERPLBLD CKD-EPI 2021: 52 ML/MIN/1.73M2
ERYTHROCYTE [DISTWIDTH] IN BLOOD BY AUTOMATED COUNT: 12.6 % (ref 11–15)
ERYTHROCYTE [SEDIMENTATION RATE] IN BLOOD BY WESTERGREN METHOD: 2 MM/H
EST. AVERAGE GLUCOSE BLD GHB EST-MCNC: 105 MG/DL
EST. AVERAGE GLUCOSE BLD GHB EST-SCNC: 5.8 MMOL/L
FOLATE SERPL-MCNC: >24 NG/ML
GLUCOSE SERPL-MCNC: 123 MG/DL (ref 65–99)
HBA1C MFR BLD: 5.3 %
HCT VFR BLD AUTO: 39.7 % (ref 38.5–50)
HGB BLD-MCNC: 13.2 G/DL (ref 13.2–17.1)
MCH RBC QN AUTO: 32.8 PG (ref 27–33)
MCHC RBC AUTO-ENTMCNC: 33.2 G/DL (ref 32–36)
MCV RBC AUTO: 98.8 FL (ref 80–100)
PLATELET # BLD AUTO: 176 THOUSAND/UL (ref 140–400)
PMV BLD REES-ECKER: 9.8 FL (ref 7.5–12.5)
POTASSIUM SERPL-SCNC: 4.1 MMOL/L (ref 3.5–5.3)
RBC # BLD AUTO: 4.02 MILLION/UL (ref 4.2–5.8)
SODIUM SERPL-SCNC: 142 MMOL/L (ref 135–146)
T PALLIDUM AB SER QL IA: NORMAL
TSH SERPL-ACNC: 1.61 MIU/L (ref 0.4–4.5)
VIT B12 SERPL-MCNC: 445 PG/ML (ref 200–1100)
WBC # BLD AUTO: 2.9 THOUSAND/UL (ref 3.8–10.8)

## 2025-07-29 LAB
ANA SER QL IF: NEGATIVE
ANION GAP SERPL CALCULATED.4IONS-SCNC: 11 MMOL/L (CALC) (ref 7–17)
BUN SERPL-MCNC: 24 MG/DL (ref 7–25)
BUN/CREAT SERPL: 16 (CALC) (ref 6–22)
CALCIUM SERPL-MCNC: 9.6 MG/DL (ref 8.6–10.3)
CHLORIDE SERPL-SCNC: 105 MMOL/L (ref 98–110)
CO2 SERPL-SCNC: 26 MMOL/L (ref 20–32)
CREAT SERPL-MCNC: 1.47 MG/DL (ref 0.7–1.35)
EGFRCR SERPLBLD CKD-EPI 2021: 52 ML/MIN/1.73M2
ERYTHROCYTE [DISTWIDTH] IN BLOOD BY AUTOMATED COUNT: 12.6 % (ref 11–15)
ERYTHROCYTE [SEDIMENTATION RATE] IN BLOOD BY WESTERGREN METHOD: 2 MM/H
EST. AVERAGE GLUCOSE BLD GHB EST-MCNC: 105 MG/DL
EST. AVERAGE GLUCOSE BLD GHB EST-SCNC: 5.8 MMOL/L
FOLATE SERPL-MCNC: >24 NG/ML
GLUCOSE SERPL-MCNC: 123 MG/DL (ref 65–99)
HBA1C MFR BLD: 5.3 %
HCT VFR BLD AUTO: 39.7 % (ref 38.5–50)
HGB BLD-MCNC: 13.2 G/DL (ref 13.2–17.1)
MCH RBC QN AUTO: 32.8 PG (ref 27–33)
MCHC RBC AUTO-ENTMCNC: 33.2 G/DL (ref 32–36)
MCV RBC AUTO: 98.8 FL (ref 80–100)
PLATELET # BLD AUTO: 176 THOUSAND/UL (ref 140–400)
PMV BLD REES-ECKER: 9.8 FL (ref 7.5–12.5)
POTASSIUM SERPL-SCNC: 4.1 MMOL/L (ref 3.5–5.3)
RBC # BLD AUTO: 4.02 MILLION/UL (ref 4.2–5.8)
SODIUM SERPL-SCNC: 142 MMOL/L (ref 135–146)
T PALLIDUM AB SER QL IA: NEGATIVE
TSH SERPL-ACNC: 1.61 MIU/L (ref 0.4–4.5)
VIT B12 SERPL-MCNC: 445 PG/ML (ref 200–1100)
WBC # BLD AUTO: 2.9 THOUSAND/UL (ref 3.8–10.8)

## 2025-08-05 ENCOUNTER — APPOINTMENT (OUTPATIENT)
Dept: PRIMARY CARE | Facility: CLINIC | Age: 65
End: 2025-08-05
Payer: MEDICARE

## 2025-08-05 VITALS
DIASTOLIC BLOOD PRESSURE: 72 MMHG | SYSTOLIC BLOOD PRESSURE: 112 MMHG | BODY MASS INDEX: 21.1 KG/M2 | WEIGHT: 159.2 LBS | HEART RATE: 73 BPM | OXYGEN SATURATION: 94 % | HEIGHT: 73 IN

## 2025-08-05 DIAGNOSIS — R10.31 GROIN DISCOMFORT, RIGHT: Primary | ICD-10-CM

## 2025-08-05 PROCEDURE — 1160F RVW MEDS BY RX/DR IN RCRD: CPT

## 2025-08-05 PROCEDURE — 3008F BODY MASS INDEX DOCD: CPT

## 2025-08-05 PROCEDURE — 1159F MED LIST DOCD IN RCRD: CPT

## 2025-08-05 PROCEDURE — 99213 OFFICE O/P EST LOW 20 MIN: CPT

## 2025-08-05 ASSESSMENT — ANXIETY QUESTIONNAIRES
6. BECOMING EASILY ANNOYED OR IRRITABLE: NOT AT ALL
IF YOU CHECKED OFF ANY PROBLEMS ON THIS QUESTIONNAIRE, HOW DIFFICULT HAVE THESE PROBLEMS MADE IT FOR YOU TO DO YOUR WORK, TAKE CARE OF THINGS AT HOME, OR GET ALONG WITH OTHER PEOPLE: NOT DIFFICULT AT ALL
4. TROUBLE RELAXING: NOT AT ALL
1. FEELING NERVOUS, ANXIOUS, OR ON EDGE: NOT AT ALL
GAD7 TOTAL SCORE: 0
2. NOT BEING ABLE TO STOP OR CONTROL WORRYING: NOT AT ALL
7. FEELING AFRAID AS IF SOMETHING AWFUL MIGHT HAPPEN: NOT AT ALL
3. WORRYING TOO MUCH ABOUT DIFFERENT THINGS: NOT AT ALL
5. BEING SO RESTLESS THAT IT IS HARD TO SIT STILL: NOT AT ALL

## 2025-08-05 ASSESSMENT — ENCOUNTER SYMPTOMS
DEPRESSION: 0
CONSTIPATION: 0
ABDOMINAL PAIN: 0
CHILLS: 0
LOSS OF SENSATION IN FEET: 0
BLOOD IN STOOL: 0
OCCASIONAL FEELINGS OF UNSTEADINESS: 0
ACTIVITY CHANGE: 0
FEVER: 0
SHORTNESS OF BREATH: 0
RECTAL PAIN: 0
DIFFICULTY URINATING: 0

## 2025-08-05 ASSESSMENT — COLUMBIA-SUICIDE SEVERITY RATING SCALE - C-SSRS
6. HAVE YOU EVER DONE ANYTHING, STARTED TO DO ANYTHING, OR PREPARED TO DO ANYTHING TO END YOUR LIFE?: NO
1. IN THE PAST MONTH, HAVE YOU WISHED YOU WERE DEAD OR WISHED YOU COULD GO TO SLEEP AND NOT WAKE UP?: NO
2. HAVE YOU ACTUALLY HAD ANY THOUGHTS OF KILLING YOURSELF?: NO

## 2025-08-05 ASSESSMENT — PATIENT HEALTH QUESTIONNAIRE - PHQ9
1. LITTLE INTEREST OR PLEASURE IN DOING THINGS: NOT AT ALL
SUM OF ALL RESPONSES TO PHQ9 QUESTIONS 1 AND 2: 0
2. FEELING DOWN, DEPRESSED OR HOPELESS: NOT AT ALL

## 2025-08-05 NOTE — PROGRESS NOTES
"Subjective   Patient ID: Edmund Matthews is a 65 y.o. male who presents for New Patient Visit (Here to [facility] /Concerns for lump - right side abdominal area - 4-5 months ).    HPI   66 yo male with PMH of Diffuse large B-cell lymphoma of lymph nodes of multiple regions diagnosed in July 2024  s/p chemo, DVT, early onset alzheimer's dementia daignosed two years ago per patient's son, and BPH presents as a new patient and has some concerns. He recently established with Dr. Ybarra, last saw him on July 23rd. He was started on Aricept 5 mg for early onset on dementia by Dr. Ybarra last month. Today he is here with concern of lump & intermittent discomfort to right groin area which he noticed over a month ago. Reports he did mention it to his previous PCP about it. There is some soreness but no severe pain. Feels the lump on exertion or lifting but not at rest. Has not noticed it get bigger. No change in BM or urination, no major weight loss. Discomfort and pain does not radiate elsewhere and currently sitting here patient denies pain or discomfort.     Review of Systems   Constitutional:  Negative for activity change, chills and fever.   Respiratory:  Negative for shortness of breath.    Cardiovascular:  Negative for chest pain.   Gastrointestinal:  Negative for abdominal pain, blood in stool, constipation and rectal pain.   Genitourinary:  Negative for difficulty urinating, penile swelling, scrotal swelling and testicular pain.        Right groin intermittent discomfort   All other systems reviewed and are negative.      Objective   /72 (BP Location: Right arm, Patient Position: Sitting)   Pulse 73   Ht 1.854 m (6' 1\")   Wt 72.2 kg (159 lb 3.2 oz)   SpO2 94%   BMI 21.00 kg/m²     Physical Exam  Constitutional:       Appearance: Normal appearance.     Cardiovascular:      Rate and Rhythm: Normal rate.      Pulses:           Femoral pulses are 2+ on the right side and 2+ on the left side.  Pulmonary:      Effort: " Pulmonary effort is normal.      Breath sounds: Normal breath sounds.     Musculoskeletal:         General: Normal range of motion.      Right lower leg: No edema.      Left lower leg: No edema.   Lymphadenopathy:      Lower Body: No right inguinal adenopathy. No left inguinal adenopathy.      Comments: No tenderness to right groin on palpation, no mass or adenopathy noted, no warmth      Neurological:      Mental Status: He is alert.       Assessment & Plan  Groin discomfort, right  -Patient has PET CT lymphoma staging scheduled on August 14th and will be following with oncology that day, will hold off on additional imaging at this time, discussed with his son and the patient that it could potentially be hernia but cannot rule out other potential causes without imaging due to patient's history of lymphoma, however, no enlarged lymph nodes or mass noted on palpation today to the groin region, patient denies tenderness on palpation. PET CT done in February 2025 did not show any lesion to abdomen/pelvis region.   -If discomfort or pain becomes severe and persistent then seek immediate medical attention  -Follow up with established PCP and Oncology as scheduled

## 2025-08-06 ENCOUNTER — TELEPHONE (OUTPATIENT)
Dept: HEMATOLOGY/ONCOLOGY | Facility: HOSPITAL | Age: 65
End: 2025-08-06
Payer: MEDICARE

## 2025-08-08 NOTE — TELEPHONE ENCOUNTER
Just following up if anything else is needed or did everything get taken care of since [place] precert I know was going to secure chat team?

## 2025-08-08 NOTE — TELEPHONE ENCOUNTER
Flavia called back from [facility] and is trying to reach the team about a peer to peer  She is going to secure chat the team now. I told her it looked like Dr. Daley wants to do the peer to peer and emailed Keyon Jimenez also. Messaged team to make aware of call.

## 2025-08-14 ENCOUNTER — HOSPITAL ENCOUNTER (OUTPATIENT)
Dept: RADIOLOGY | Facility: HOSPITAL | Age: 65
Discharge: HOME | End: 2025-08-14
Payer: MEDICARE

## 2025-08-14 ENCOUNTER — OFFICE VISIT (OUTPATIENT)
Dept: HEMATOLOGY/ONCOLOGY | Facility: HOSPITAL | Age: 65
End: 2025-08-14
Payer: MEDICARE

## 2025-08-14 ENCOUNTER — LAB (OUTPATIENT)
Dept: LAB | Facility: HOSPITAL | Age: 65
End: 2025-08-14
Payer: MEDICARE

## 2025-08-14 VITALS
OXYGEN SATURATION: 99 % | SYSTOLIC BLOOD PRESSURE: 112 MMHG | TEMPERATURE: 98.1 F | RESPIRATION RATE: 18 BRPM | HEART RATE: 68 BPM | BODY MASS INDEX: 20.73 KG/M2 | WEIGHT: 157.1 LBS | DIASTOLIC BLOOD PRESSURE: 64 MMHG

## 2025-08-14 DIAGNOSIS — C83.38 DIFFUSE LARGE B-CELL LYMPHOMA OF LYMPH NODES OF MULTIPLE REGIONS (MULTI): ICD-10-CM

## 2025-08-14 LAB
ALBUMIN SERPL BCP-MCNC: 4.5 G/DL (ref 3.4–5)
ALP SERPL-CCNC: 55 U/L (ref 33–136)
ALT SERPL W P-5'-P-CCNC: 18 U/L (ref 10–52)
ANION GAP SERPL CALC-SCNC: 11 MMOL/L (ref 10–20)
AST SERPL W P-5'-P-CCNC: 18 U/L (ref 9–39)
BASOPHILS # BLD AUTO: 0.02 X10*3/UL (ref 0–0.1)
BASOPHILS NFR BLD AUTO: 0.6 %
BILIRUB SERPL-MCNC: 1 MG/DL (ref 0–1.2)
BUN SERPL-MCNC: 19 MG/DL (ref 6–23)
CALCIUM SERPL-MCNC: 9.6 MG/DL (ref 8.6–10.3)
CHLORIDE SERPL-SCNC: 102 MMOL/L (ref 98–107)
CO2 SERPL-SCNC: 30 MMOL/L (ref 21–32)
CREAT SERPL-MCNC: 1.26 MG/DL (ref 0.5–1.3)
EGFRCR SERPLBLD CKD-EPI 2021: 63 ML/MIN/1.73M*2
EOSINOPHIL # BLD AUTO: 0 X10*3/UL (ref 0–0.7)
EOSINOPHIL NFR BLD AUTO: 0 %
ERYTHROCYTE [DISTWIDTH] IN BLOOD BY AUTOMATED COUNT: 12.9 % (ref 11.5–14.5)
GLUCOSE SERPL-MCNC: 87 MG/DL (ref 74–99)
HCT VFR BLD AUTO: 41.2 % (ref 41–52)
HGB BLD-MCNC: 13.8 G/DL (ref 13.5–17.5)
IMM GRANULOCYTES # BLD AUTO: 0.01 X10*3/UL (ref 0–0.7)
IMM GRANULOCYTES NFR BLD AUTO: 0.3 % (ref 0–0.9)
LYMPHOCYTES # BLD AUTO: 1.02 X10*3/UL (ref 1.2–4.8)
LYMPHOCYTES NFR BLD AUTO: 28.8 %
MCH RBC QN AUTO: 32.4 PG (ref 26–34)
MCHC RBC AUTO-ENTMCNC: 33.5 G/DL (ref 32–36)
MCV RBC AUTO: 97 FL (ref 80–100)
MONOCYTES # BLD AUTO: 0.34 X10*3/UL (ref 0.1–1)
MONOCYTES NFR BLD AUTO: 9.6 %
NEUTROPHILS # BLD AUTO: 2.15 X10*3/UL (ref 1.2–7.7)
NEUTROPHILS NFR BLD AUTO: 60.7 %
NRBC BLD-RTO: 0 /100 WBCS (ref 0–0)
PLATELET # BLD AUTO: 170 X10*3/UL (ref 150–450)
POTASSIUM SERPL-SCNC: 4 MMOL/L (ref 3.5–5.3)
PROT SERPL-MCNC: 7 G/DL (ref 6.4–8.2)
RBC # BLD AUTO: 4.26 X10*6/UL (ref 4.5–5.9)
SODIUM SERPL-SCNC: 139 MMOL/L (ref 136–145)
WBC # BLD AUTO: 3.5 X10*3/UL (ref 4.4–11.3)

## 2025-08-14 PROCEDURE — 85025 COMPLETE CBC W/AUTO DIFF WBC: CPT

## 2025-08-14 PROCEDURE — 99214 OFFICE O/P EST MOD 30 MIN: CPT | Performed by: INTERNAL MEDICINE

## 2025-08-14 PROCEDURE — 36415 COLL VENOUS BLD VENIPUNCTURE: CPT

## 2025-08-14 PROCEDURE — 78815 PET IMAGE W/CT SKULL-THIGH: CPT | Mod: PET TUMOR SUBSQ TX STRATEGY | Performed by: RADIOLOGY

## 2025-08-14 PROCEDURE — 80053 COMPREHEN METABOLIC PANEL: CPT

## 2025-08-14 PROCEDURE — A9552 F18 FDG: HCPCS

## 2025-08-14 PROCEDURE — 1159F MED LIST DOCD IN RCRD: CPT | Performed by: INTERNAL MEDICINE

## 2025-08-14 PROCEDURE — 3430000001 HC RX 343 DIAGNOSTIC RADIOPHARMACEUTICALS

## 2025-08-14 PROCEDURE — 78815 PET IMAGE W/CT SKULL-THIGH: CPT | Mod: PS

## 2025-08-14 PROCEDURE — 1126F AMNT PAIN NOTED NONE PRSNT: CPT | Performed by: INTERNAL MEDICINE

## 2025-08-14 RX ORDER — FLUDEOXYGLUCOSE F 18 200 MCI/ML
12.5 INJECTION, SOLUTION INTRAVENOUS
Status: COMPLETED | OUTPATIENT
Start: 2025-08-14 | End: 2025-08-14

## 2025-08-14 RX ADMIN — FLUDEOXYGLUCOSE F 18 12.5 MILLICURIE: 200 INJECTION, SOLUTION INTRAVENOUS at 08:07

## 2025-08-14 ASSESSMENT — ENCOUNTER SYMPTOMS
FATIGUE: 1
CARDIOVASCULAR NEGATIVE: 1
MUSCULOSKELETAL NEGATIVE: 1
GASTROINTESTINAL NEGATIVE: 1
HEMATOLOGIC/LYMPHATIC NEGATIVE: 1
NEUROLOGICAL NEGATIVE: 1
PSYCHIATRIC NEGATIVE: 1
EYES NEGATIVE: 1
ENDOCRINE NEGATIVE: 1

## 2025-08-14 ASSESSMENT — PAIN SCALES - GENERAL: PAINLEVEL_OUTOF10: 0-NO PAIN

## 2025-08-20 ENCOUNTER — APPOINTMENT (OUTPATIENT)
Dept: PRIMARY CARE | Facility: CLINIC | Age: 65
End: 2025-08-20
Payer: MEDICARE

## 2025-08-20 ENCOUNTER — TELEPHONE (OUTPATIENT)
Dept: HEMATOLOGY/ONCOLOGY | Facility: HOSPITAL | Age: 65
End: 2025-08-20

## 2025-08-20 VITALS — DIASTOLIC BLOOD PRESSURE: 65 MMHG | SYSTOLIC BLOOD PRESSURE: 100 MMHG

## 2025-08-20 DIAGNOSIS — G30.0 MILD EARLY ONSET ALZHEIMER'S DEMENTIA WITHOUT BEHAVIORAL DISTURBANCE, PSYCHOTIC DISTURBANCE, MOOD DISTURBANCE, OR ANXIETY (MULTI): ICD-10-CM

## 2025-08-20 DIAGNOSIS — F02.A0 MILD EARLY ONSET ALZHEIMER'S DEMENTIA WITHOUT BEHAVIORAL DISTURBANCE, PSYCHOTIC DISTURBANCE, MOOD DISTURBANCE, OR ANXIETY (MULTI): ICD-10-CM

## 2025-08-20 PROCEDURE — G2211 COMPLEX E/M VISIT ADD ON: HCPCS | Performed by: FAMILY MEDICINE

## 2025-08-20 PROCEDURE — 1159F MED LIST DOCD IN RCRD: CPT | Performed by: FAMILY MEDICINE

## 2025-08-20 PROCEDURE — 99213 OFFICE O/P EST LOW 20 MIN: CPT | Performed by: FAMILY MEDICINE

## 2025-08-20 PROCEDURE — 1036F TOBACCO NON-USER: CPT | Performed by: FAMILY MEDICINE

## 2025-08-20 PROCEDURE — 1160F RVW MEDS BY RX/DR IN RCRD: CPT | Performed by: FAMILY MEDICINE

## 2025-08-20 RX ORDER — DONEPEZIL HYDROCHLORIDE 10 MG/1
10 TABLET, FILM COATED ORAL NIGHTLY
Qty: 90 TABLET | Refills: 0 | Status: SHIPPED | OUTPATIENT
Start: 2025-08-20 | End: 2026-02-16

## 2025-08-29 ENCOUNTER — APPOINTMENT (OUTPATIENT)
Dept: HEMATOLOGY/ONCOLOGY | Facility: HOSPITAL | Age: 65
End: 2025-08-29
Payer: MEDICARE

## 2025-09-24 ENCOUNTER — APPOINTMENT (OUTPATIENT)
Dept: HEMATOLOGY/ONCOLOGY | Facility: CLINIC | Age: 65
End: 2025-09-24
Payer: MEDICARE

## 2025-11-10 ENCOUNTER — APPOINTMENT (OUTPATIENT)
Dept: SURGERY | Facility: CLINIC | Age: 65
End: 2025-11-10
Payer: MEDICARE

## 2025-11-13 ENCOUNTER — APPOINTMENT (OUTPATIENT)
Dept: PRIMARY CARE | Facility: CLINIC | Age: 65
End: 2025-11-13
Payer: MEDICARE

## 2025-11-14 ENCOUNTER — APPOINTMENT (OUTPATIENT)
Dept: HEMATOLOGY/ONCOLOGY | Facility: HOSPITAL | Age: 65
End: 2025-11-14
Payer: MEDICARE

## 2026-06-18 ENCOUNTER — APPOINTMENT (OUTPATIENT)
Dept: PRIMARY CARE | Facility: CLINIC | Age: 66
End: 2026-06-18
Payer: MEDICARE